# Patient Record
Sex: MALE | Race: BLACK OR AFRICAN AMERICAN | Employment: OTHER | URBAN - METROPOLITAN AREA
[De-identification: names, ages, dates, MRNs, and addresses within clinical notes are randomized per-mention and may not be internally consistent; named-entity substitution may affect disease eponyms.]

---

## 2017-07-25 ENCOUNTER — OFFICE VISIT (OUTPATIENT)
Dept: PULMONOLOGY | Age: 67
End: 2017-07-25

## 2017-07-25 VITALS
HEIGHT: 73 IN | RESPIRATION RATE: 16 BRPM | SYSTOLIC BLOOD PRESSURE: 140 MMHG | TEMPERATURE: 97.8 F | WEIGHT: 278 LBS | DIASTOLIC BLOOD PRESSURE: 80 MMHG | HEART RATE: 77 BPM | BODY MASS INDEX: 36.84 KG/M2 | OXYGEN SATURATION: 98 %

## 2017-07-25 DIAGNOSIS — E66.9 OBESITY (BMI 30-39.9): ICD-10-CM

## 2017-07-25 DIAGNOSIS — G47.33 OSA (OBSTRUCTIVE SLEEP APNEA): Primary | ICD-10-CM

## 2017-07-25 DIAGNOSIS — G47.00 INSOMNIA, UNSPECIFIED TYPE: ICD-10-CM

## 2017-07-25 DIAGNOSIS — Z72.821 INADEQUATE SLEEP HYGIENE: ICD-10-CM

## 2017-07-25 RX ORDER — IBUPROFEN 400 MG/1
1 TABLET ORAL
COMMUNITY
Start: 2017-04-24

## 2017-07-25 RX ORDER — AMLODIPINE BESYLATE 5 MG/1
10 TABLET ORAL DAILY
COMMUNITY
Start: 2017-04-24

## 2017-07-25 NOTE — PROGRESS NOTES
[facility]  Pulmonary, Critical Care, and Sleep Medicine      Sleep Office Follow up    Name: Amilcar Tolentino     : 1950     Date: 2017        Patient is a 60year old  gentleman who receives much of his medical care in the [facility]. 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 His hospital course was complicated by a pulmonary embolism and subsequent hematoma from anticoagulation that required nephrostomy tubes. He reports he has recovered from that condition and represents today for follow up of severe sleep apnea, AHI of 30, RDI of 30. He was initially started on a CPAP of 19 cwp but due to intolerance issues the patient was switched to APAP 5/20 cwp. The patient does have a beard and does have issues with mask tolerance and leaks. He normally goes to sleep at about 2400 and will sleep at most for 3 hours and then gets up to urinate. After that he does have some difficulty falling back to sleep and does not always put his CPAP mask back on. He awakens at 0530 to start his day which has been his pattern for several years. Sometimes watches TV or does work for the daytime, versus lies in the bed. Does drink tea. No history of any caffeine, alcohol or smoking. Denies any pain or leg symptoms disturbing his sleep at night      . Chan Mcarthur Winslow Sleepiness Score: 3  Initial     . Chan Mcarthur PHQ over the last two weeks 2017   Little interest or pleasure in doing things Not at all   Feeling down, depressed or hopeless Not at all   Total Score PHQ 2 0     . Chan Mcarthur PAP Compliance  Report & Summary Trends  DME: [facility]    Date >4 hr use % time AHI PAP Rx Pressure @ 95% Median Use Time Leak-Median   -17 18% (Total 64%) 4.2 APAP  14.2 3:36 56.9                                  Assessment:  1. Severe sleep apnea sub-optimized due to inadequate CPAP use  2. Sleep maintenance insomnia.    3. Inadequate sleep times/ poor sleep hygiene  4. Some of the compliance might be related to mask, pressure intolerance and poor sleep in the middle of the night. 5. Obesity    Discussion:  I have discussed the above with the patient. TAWANNA and CPAP education given as well as compliance goals. The patient may do better with a different mask due to the  On-going leaks. Sleep hygiene also discussed and handout given. The patient will work on his sleep hygiene and attempt to use more of his CPAP. S[place]y today reported at 3/24. Final Recommendations:  1. Continue with APAP for now  2. Mask refit  And CPAP supplies ordered  3. Sleep hygiene discussed. 4.  safety encouraged  5. Patient to follow up with Primary Care Provider at [facility] for report of pulmonary nodule and other health maintaince   6. Weight loss and healthy lifestyle changes encouraged  7. Follow up in three months or sooner if needed      INDICATION: ESS 13     CONCLUSION     severe obstructive sleep apnea syndrome with AHI of 30, and  respiratory disturbance index (RDI) of 30. A very successful CPAP titration with elimination of obstructive  sleep apnea and associated hypoxemia. At 19 CPAP AHI reduced to  0, RDI reduced to 0 REM supine sleep seen, REM seen. Snore index  4, Arousal index 8    Lowest oxygen saturation 74%. At optimal pressure oxygen  saturation 93-95%. Positional and REM association to respiratory events. No significant LM related arousals seen. 1 LM arousal index  improved to 0 on therapy. At 12 CPAP AHI reduced to 0, RDI reduced to 0 REM supine sleep  seen, REM seen. Snore Index 404, Arousal Index 3. Sleep efficiency during diagnostic portion 91 with 144 min of  sleep and treatment portion 52 with 187 min sleep    Average heart rate 74-84, Arrythmia none occasional PVCs seen  also elevated BP noted during surgery. RECOMMENDATIONS     Overnight CPAP at 19 CWP with ramp and humidifier.      Mask: Quattro Mirage Large or mask of choice. If issues with pressure tolerance CPAP 12 is consider optimal.    If continue to have symptoms and issues with AHI as other CPAP  13,14 were good but noted hypopnea at higher pressure consider  Auto PAP 5-20 or re titration. .  Current Outpatient Prescriptions   Medication Sig Dispense Refill    amLODIPine (NORVASC) 5 mg tablet Take 1 Tab by mouth daily.  ibuprofen (MOTRIN) 400 mg tablet Take 1 Tab by mouth daily.  cpap machine kit by Does Not Apply route. Overnight Auto CPAP at 5-20 CWP with ramp and humidifier. Mask: Nasal mask. Supplies 99 month. Please send compliance data [id number]. Diagnosis TAWANNA. AHI 30 RDI 30. [employer] order adjustment 1 Kit 0    aspirin delayed-release 81 mg tablet Take  by mouth daily.  zaleplon (SONATA) 5 mg capsule Take 1 Cap by mouth nightly. Max Daily Amount: 5 mg. 30 Cap 3    metFORMIN (GLUCOPHAGE) 850 mg tablet Take  by mouth two (2) times daily (with meals).  atorvastatin (LIPITOR) 10 mg tablet Take 10 mg by mouth daily.  naproxen (NAPROSYN) 500 mg tablet Take 500 mg by mouth two (2) times daily (with meals).  white petrolatum-mineral oil (EUCERIN) topical cream Apply  to affected area as needed for Dry Skin.  terbinafine HCl (LAMISIL) 1 % topical cream Apply  to affected area two (2) times a day.  ergocalciferol (ERGOCALCIFEROL) 50,000 unit capsule Take 50,000 Units by mouth.  tiZANidine (ZANAFLEX) 4 mg tablet Take 4 mg by mouth every six (6) hours as needed.  calcium carbonate-vitamin d2 600-125 mg-unit tab Take  by mouth.  cyclobenzaprine (FLEXERIL) 5 mg tablet Take 5 mg by mouth three (3) times daily as needed for Muscle Spasm(s).  acetaminophen (TYLENOL) 650 mg CR tablet Take 650 mg by mouth every six (6) hours as needed for Pain.  cyclobenzaprine (FLEXERIL) 10 mg tablet Take  by mouth three (3) times daily as needed for Muscle Spasm(s).            Review of Systems:  HEENT: No epistaxis, no nasal drainage, no difficulty in swallowing, no redness in eyes  Respiratory: No cough sob or wheezing  Sleep: As above  Cardiovascular: no chest pain, no palpitations, no chronic leg edema, no syncope  Gastrointestinal: no abd pain, no vomiting, no diarrhea, no bleeding symptoms  Genitourinary: No urinary symptoms or hematuria  Integument/breast: No ulcers or rashes  Musculoskeletal:Neg  Neurological: No focal weakness, no seizures, no headaches  Behvioral/Psych: No anxiety, no depression  Constitutional: No fever, no chills, no weight loss, no night sweats     Objective:     Visit Vitals    /80 (BP 1 Location: Right arm, BP Patient Position: Sitting)    Pulse 77    Temp 97.8 °F (36.6 °C)    Resp 16    Ht 6' 1\" (1.854 m)    Wt 126.1 kg (278 lb)    SpO2 98%    BMI 36.68 kg/m2        Physical Exam:   General: comfortable, no acute distress  HEENT: pupils reactive, sclera anicteric, EOM intact, Malampati score 3,   Tongue: Macroglossia no Teeth indentation no  Chin: Micrognathia no  Neck: No adenopathy or thyroid swelling, no lymphadenopathy or JVD, supple  CVS: S1S2 no murmurs  RS: Mod AE bilaterally, no tactile fremitus or egophony, no accessory muscle use  Abd: soft, non tender, no hepatosplenomegaly  Neuro: non focal, awake, alert  Extrm: no leg edema, clubbing or cyanosis  Skin: no rash    Data review:     Chemistry No results for input(s): GLU, NA, K, CL, CO2, BUN, CREA, CA, MG, PHOS, AGAP, BUCR, TBIL, GPT, AP, TP, ALB, GLOB, AGRAT in the last 72 hours. Lactic Acid No results found for: LAC  No results for input(s): LAC in the last 72 hours. Liver Enzymes No results found for: TP  No results for input(s): TP, ALB, GLOB, AGRAT, SGOT, GPT, AP, TBIL in the last 72 hours. No lab exists for component: DBIL     CBC w/Diff No results for input(s): WBC, RBC, HGB, HCT, PLT, GRANS, LYMPH, EOS, HGBEXT, HCTEXT, PLTEXT in the last 72 hours.      Cardiac Enzymes No results found for: CPK BNP No results found for: BNP     Coagulation No results for input(s): PTP, INR, APTT in the last 72 hours. No lab exists for component: INREXT      Thyroid  No results found for: T4       ABG No results for input(s): PHI, PHI, POC2, PCO2I, PO2, PO2I, HCO3, HCO3I, FIO2, FIO2I in the last 72 hours. Urinalysis No results found for: COLOR     Micro  No results for input(s): SDES, CULT in the last 72 hours. No results for input(s): CULT in the last 72 hours. XR (Most Recent). CXR reviewed by me and compared with previous CXR No results found for this or any previous visit. CT (Most Recent) No results found for this or any previous visit. EKG No results found for this or any previous visit. ECHO No results found for this or any previous visit.      PFT                     Kathren Halsted, DO  Pulmonary Critical Care & Sleep Medicine

## 2017-07-25 NOTE — PATIENT INSTRUCTIONS
Check with Primary Provider at [facility] about pulmonary nodule and if you need further evaluation.

## 2018-03-08 ENCOUNTER — OFFICE VISIT (OUTPATIENT)
Dept: PULMONOLOGY | Age: 68
End: 2018-03-08

## 2018-03-08 VITALS
WEIGHT: 280 LBS | BODY MASS INDEX: 37.11 KG/M2 | OXYGEN SATURATION: 95 % | HEIGHT: 73 IN | RESPIRATION RATE: 18 BRPM | HEART RATE: 64 BPM | SYSTOLIC BLOOD PRESSURE: 140 MMHG | DIASTOLIC BLOOD PRESSURE: 80 MMHG | TEMPERATURE: 97.3 F

## 2018-03-08 DIAGNOSIS — E66.9 OBESITY (BMI 30-39.9): ICD-10-CM

## 2018-03-08 DIAGNOSIS — G47.00 INSOMNIA, UNSPECIFIED TYPE: Primary | ICD-10-CM

## 2018-03-08 DIAGNOSIS — G47.33 OSA (OBSTRUCTIVE SLEEP APNEA): ICD-10-CM

## 2018-03-08 DIAGNOSIS — I10 ESSENTIAL HYPERTENSION: ICD-10-CM

## 2018-03-08 NOTE — PROGRESS NOTES
Chief Complaint   Patient presents with    CPAP Compliance    CPAP    Sleep Apnea     Patient here for CPAP compliance report evaluation. Depression Screening done PHQ 2 populated. Further assessment not needed. PHQ2=0,ESS=8 DR. Ulrich notified.

## 2018-03-08 NOTE — MR AVS SNAPSHOT
[address] [facility], [address] [address] 
[phone] Patient: Edith Arshad MRN: [id number] RSU:[birth year] Visit Information Date & Time Provider Department Dept. Phone Encounter #  
 3/8/2018  8:30 AM Tiffany Wright DO [facility] Frank Pavon [id number] Follow-up Instructions Return in about 3 months (around 6/8/2018). Upcoming Health Maintenance Date Due Hepatitis C Screening 1950 DTaP/Tdap/Td series (1 - Tdap) 11/9/1973 FOBT Q 1 YEAR AGE 50-75 11/9/2002 ZOSTER VACCINE AGE 60> 9/9/2012 Influenza Age 5 to Adult 8/1/2017 GLAUCOMA SCREENING Q2Y 11/9/2017 Pneumococcal 65+ Low/Medium Risk (1 of 2 - PCV13) 11/9/2017 MEDICARE YEARLY EXAM 11/9/2017 Allergies as of 3/8/2018  Review Complete On: 3/8/2018 By: Michael Gould LPN No Known Allergies Current Immunizations  Never Reviewed No immunizations on file. Not reviewed this visit You Were Diagnosed With   
  
 Codes Comments Insomnia, unspecified type    -  Primary ICD-10-CM: G47.00 ICD-9-CM: 780.52   
 TAWANNA (obstructive sleep apnea)     ICD-10-CM: G47.33 
ICD-9-CM: 327.23 Vitals BP Pulse Temp Resp Height(growth percentile) Weight(growth percentile) 140/80 (BP 1 Location: Left arm, BP Patient Position: Sitting) 64 97.3 °F (36.3 °C) (Oral) 18 6' 1\" (1.854 m) 280 lb (127 kg) SpO2 BMI Smoking Status 95% 36.94 kg/m2 Never Smoker BMI and BSA Data Body Mass Index Body Surface Area  
 36.94 kg/m 2 2.56 m 2 Your Updated Medication List  
  
   
This list is accurate as of 3/8/18  8:49 AM.  Always use your most recent med list.  
  
  
  
  
 acetaminophen 650 mg Tber Commonly known as:  TYLENOL Take 650 mg by mouth every six (6) hours as needed for Pain. amLODIPine 5 mg tablet Commonly known as:  [place] Sandhoff Take 1 Tab by mouth daily. aspirin delayed-release 81 mg tablet Take  by mouth daily. atorvastatin 10 mg tablet Commonly known as:  LIPITOR Take 10 mg by mouth daily. calcium carbonate-vitamin d2 600-125 mg-unit Tab Take  by mouth. cpap machine kit  
by Does Not Apply route. Overnight Auto CPAP at 5-20 CWP with ramp and humidifier. Mask: Nasal mask. Supplies 99 month. Please send compliance data [id number]. Diagnosis TAWANNA. AHI 30 RDI 30. [employer] order adjustment * cyclobenzaprine 5 mg tablet Commonly known as:  FLEXERIL Take 5 mg by mouth three (3) times daily as needed for Muscle Spasm(s). * cyclobenzaprine 10 mg tablet Commonly known as:  FLEXERIL Take  by mouth three (3) times daily as needed for Muscle Spasm(s). ergocalciferol 50,000 unit capsule Commonly known as:  ERGOCALCIFEROL Take 50,000 Units by mouth. ibuprofen 400 mg tablet Commonly known as:  MOTRIN Take 1 Tab by mouth daily. metFORMIN 850 mg tablet Commonly known as:  GLUCOPHAGE Take  by mouth two (2) times daily (with meals). naproxen 500 mg tablet Commonly known as:  NAPROSYN Take 500 mg by mouth two (2) times daily (with meals). OTHER(NON-FORMULARY) 2mg tab Ultramelatonin (REMfresh). Take 1 tab QHS can increase up to 5 tabs QHS  
  
 terbinafine HCl 1 % topical cream  
Commonly known as:  LAMISIL Apply  to affected area two (2) times a day. tiZANidine 4 mg tablet Commonly known as:  Tresia Desiree Take 4 mg by mouth every six (6) hours as needed. white petrolatum-mineral oil topical cream  
Commonly known as:  EUCERIN Apply  to affected area as needed for Dry Skin.  
  
 zaleplon 5 mg capsule Commonly known as:  SONATA Take 1 Cap by mouth nightly. Max Daily Amount: 5 mg.  
  
 * Notice: This list has 2 medication(s) that are the same as other medications prescribed for you.  Read the directions carefully, and ask your doctor or other care provider to review them with you. We Performed the Following AMB SUPPLY ORDER [[id number] Custom] Comments:  
 Please renew all CPAP supplies. Refit mask due to excess leaks. Dx: TAWANNA Follow-up Instructions Return in about 3 months (around 6/8/2018). Introducing [facility]! Viji Copeland introduces [email or web address] patient portal. Now you can access parts of your medical record, email your doctor's office, and request medication refills online. 1. In your internet browser, go to [email or web address]. [email or web address] 2. Click on the First Time User? Click Here link in the Sign In box. You will see the New Member Sign Up page. 3. Enter your [email or web address] Access Code exactly as it appears below. You will not need to use this code after youve completed the sign-up process. If you do not sign up before the expiration date, you must request a new code. · [email or web address] Access Code: [email or web address] Expires: 6/6/2018  8:07 AM 
 
4. Enter the last four digits of your Social Security Number (xxxx) and Date of Birth (mm/dd/yyyy) as indicated and click Submit. You will be taken to the next sign-up page. 5. Create a [email or web address] ID. This will be your [email or web address] login ID and cannot be changed, so think of one that is secure and easy to remember. 6. Create a [email or web address] password. You can change your password at any time. 7. Enter your Password Reset Question and Answer. This can be used at a later time if you forget your password. 8. Enter your e-mail address. You will receive e-mail notification when new information is available in [address] 9. Click Sign Up. You can now view and download portions of your medical record. 10. Click the Download Summary menu link to download a portable copy of your medical information. If you have questions, please visit the Frequently Asked Questions section of the [email or web address] website.  Remember, [email or web address] is NOT to be used for urgent needs. For medical emergencies, dial 911. Now available from your iPhone and Android! Please provide this summary of care documentation to your next provider. Your primary care clinician is listed as Claudette Koroma. If you have any questions after today's visit, please call [phone].

## 2018-03-08 NOTE — PROGRESS NOTES
TA[facility]NYA [facility]  Pulmonary, Critical Care, and Sleep Medicine      Sleep Office Follow up    Name: Jason Gutierrez     : 1950     Date: 3/8/2018         Reason For Visit: Follow Up Severe TAWANNA    TAWANNA:  The patient presents today for routine follow up. States he has been trying to use his APAP more. He reports breathing better when he sleeps when he uses his device. He does use his device nearly every night. Insomnia continues to be an issue. He was also noted to have a leak issue with his mask. Per the patient his DME did not refit his mask. Insomnia:  Goes to bed when he feels tired which is usually between 1586-2362. Room is dark. Does not look at TV or use electronic devices. Sleeps for about 3 hours and then awakens for unknown reason. Denies any pain issues. No nightmares. After awakening he has difficulty going back to sleep. Does not take planned naps but he does doze off during the day. Drowsy driving occurs on long distance driving which he avoids. Prior Sleep Agents  - Sonata- no benefit    Weight loss:  - He has purchased a Fit Bit and has been trying to monitor his sleep and activity with this device. He is currently working on achieving 6K steps.      [place]:  Initial     [place] Scale 3/8/2018 2017 2015   Sitting and Reading 1 0 2   Watching TV 1 1 3   Sitting, inactive in a public place (e.g. a movie theater or meeting) 1 0 1   As a passenger in a car for an hour, without a break 2 1 2   Lying down to rest in the afternoon, when circumstances permit 2 1 3   Sitting and talking to someone 0 0 0   Sitting quietly after lunch without alcohol 1 0 2   In a car, while stopped for a few minutes in traffic 0 0 0   [place] Sleepiness Score 8 3 13     PHQ over the last two weeks 3/8/2018   PHQ Not Done Patient Decline   Little interest or pleasure in doing things Not at all   Feeling down, depressed or hopeless Not at all   Total Score PHQ 2 0     PAP Compliance  Report & Summary Trends  DME: [facility]    Date >4 hr use % time (Total % Use) AHI PAP Rx Pressure @ 95% Median Use Time Leak-Median   4/26-7/24/17 18 (64) 4.2 APAP 5/20 14.2 3:36 56.9   12/8/17-3/7/2018 49 (86) 4.4  13.2 4:14 73.3                       Current Outpatient Prescriptions   Medication Sig Dispense Refill    amLODIPine (NORVASC) 5 mg tablet Take 1 Tab by mouth daily.  ibuprofen (MOTRIN) 400 mg tablet Take 1 Tab by mouth daily.  cpap machine kit by Does Not Apply route. Overnight Auto CPAP at 5-20 CWP with ramp and humidifier. Mask: Nasal mask. Supplies 99 month. Please send compliance data [id number]. Diagnosis TAWANNA. AHI 30 RDI 30. [facility] order adjustment 1 Kit 0    aspirin delayed-release 81 mg tablet Take  by mouth daily.  acetaminophen (TYLENOL) 650 mg CR tablet Take 650 mg by mouth every six (6) hours as needed for Pain.  zaleplon (SONATA) 5 mg capsule Take 1 Cap by mouth nightly. Max Daily Amount: 5 mg. 30 Cap 3    metFORMIN (GLUCOPHAGE) 850 mg tablet Take  by mouth two (2) times daily (with meals).  atorvastatin (LIPITOR) 10 mg tablet Take 10 mg by mouth daily.  naproxen (NAPROSYN) 500 mg tablet Take 500 mg by mouth two (2) times daily (with meals).  white petrolatum-mineral oil (EUCERIN) topical cream Apply  to affected area as needed for Dry Skin.  terbinafine HCl (LAMISIL) 1 % topical cream Apply  to affected area two (2) times a day.  ergocalciferol (ERGOCALCIFEROL) 50,000 unit capsule Take 50,000 Units by mouth.  tiZANidine (ZANAFLEX) 4 mg tablet Take 4 mg by mouth every six (6) hours as needed.  calcium carbonate-vitamin d2 600-125 mg-unit tab Take  by mouth.  cyclobenzaprine (FLEXERIL) 5 mg tablet Take 5 mg by mouth three (3) times daily as needed for Muscle Spasm(s).  cyclobenzaprine (FLEXERIL) 10 mg tablet Take  by mouth three (3) times daily as needed for Muscle Spasm(s).            Review of Systems:  HEENT: No epistaxis, no nasal drainage, no difficulty in swallowing, no redness in eyes  Respiratory: No cough sob or wheezing  Sleep: As above  Cardiovascular: no chest pain, no palpitations, no chronic leg edema, no syncope  Gastrointestinal: no abd pain, no vomiting, no diarrhea, no bleeding symptoms  Genitourinary: No urinary symptoms or hematuria  Integument/breast: No ulcers or rashes  Musculoskeletal:Neg  Neurological: No focal weakness, no seizures, no headaches  Behvioral/Psych: No anxiety, no depression  Constitutional: No fever, no chills, no weight loss, no night sweats     Objective:     Visit Vitals    /80 (BP 1 Location: Left arm, BP Patient Position: Sitting)    Pulse 64    Temp 97.3 °F (36.3 °C) (Oral)    Resp 18    Ht 6' 1\" (1.854 m)    Wt 127 kg (280 lb)    SpO2 95%  Comment: [email or web address]    BMI 36.94 kg/m2          4/1/2015 10/13/2015 7/25/2017 3/8/2018   WEIGHT 278 lb 275 lb 4 oz 278 lb 280 lb         Physical Exam:   General: comfortable, no acute distress, Obese body habitus  HEENT: pupils reactive, sclera anicteric, EOM intact, Malampati score 3,   Tongue: + Macroglossia, midline  Chin: Micrognathia no  Neck: No adenopathy or thyroid swelling, no lymphadenopathy or JVD, supple  CVS: S1S2 no murmurs  RS: Mod AE bilaterally, no tactile fremitus or egophony, no accessory muscle use  Abd: Obeese,soft, non tender, no hepatosplenomegaly palpated  Ext:  No clubbing, edema or cyanosis  Neuro: non focal, awake, alert  Extrm: no leg edema, clubbing or cyanosis  Skin: no rash    Data review:     Chemistry No results for input(s): GLU, NA, K, CL, CO2, BUN, CREA, CA, MG, PHOS, AGAP, BUCR, TBIL, GPT, AP, TP, ALB, GLOB, AGRAT in the last 72 hours. Lactic Acid No results found for: LAC  No results for input(s): LAC in the last 72 hours. Liver Enzymes No results found for: TP  No results for input(s): TP, ALB, GLOB, AGRAT, SGOT, GPT, AP, TBIL in the last 72 hours.     No lab exists for component: DBIL CBC w/Diff No results for input(s): WBC, RBC, HGB, HCT, PLT, GRANS, LYMPH, EOS, HGBEXT, HCTEXT, PLTEXT in the last 72 hours. Cardiac Enzymes No results found for: CPK     BNP No results found for: BNP     Coagulation No results for input(s): PTP, INR, APTT in the last 72 hours. No lab exists for component: INREXT      Thyroid  No results found for: T4       ABG No results for input(s): PHI, PHI, POC2, PCO2I, PO2, PO2I, HCO3, HCO3I, FIO2, FIO2I in the last 72 hours. Urinalysis No results found for: COLOR     Micro  No results for input(s): SDES, CULT in the last 72 hours. No results for input(s): CULT in the last 72 hours. XR (Most Recent). CXR reviewed by me and compared with previous CXR No results found for this or any previous visit. CT (Most Recent) No results found for this or any previous visit. EKG No results found for this or any previous visit. ECHO No results found for this or any previous visit. PFT        Prior Sleep Testing  1/21/2015  INDICATION: ESS 13     CONCLUSION     severe obstructive sleep apnea syndrome with AHI of 30, and  respiratory disturbance index (RDI) of 30. A very successful CPAP titration with elimination of obstructive  sleep apnea and associated hypoxemia. At 19 CPAP AHI reduced to  0, RDI reduced to 0 REM supine sleep seen, REM seen. Snore index  4, Arousal index 8    Lowest oxygen saturation 74%. At optimal pressure oxygen  saturation 93-95%. Positional and REM association to respiratory events. No significant LM related arousals seen. 1 LM arousal index  improved to 0 on therapy. At 12 CPAP AHI reduced to 0, RDI reduced to 0 REM supine sleep  seen, REM seen. Snore Index 404, Arousal Index 3. Sleep efficiency during diagnostic portion 91 with 144 min of  sleep and treatment portion 52 with 187 min sleep    Average heart rate 74-84, Arrythmia none occasional PVCs seen  also elevated BP noted during surgery.       RECOMMENDATIONS Overnight CPAP at 19 CWP with ramp and humidifier. Mask: Quattro [place] Large or mask of choice. If issues with pressure tolerance CPAP 12 is consider optimal.    If continue to have symptoms and issues with AHI as other CPAP  13,14 were good but noted hypopnea at higher pressure consider  Auto PAP 5-20 or re titration. Assessment:  1. Severe sleep apnea (AHI 30; 2015)- improved use time; insomnia inhibiting use. [place] improved from 2015  2. Sleep maintenance insomnia. 3. Inadequate sleep times/ poor sleep hygiene  4. Some of the compliance might be related to mask, pressure intolerance and poor sleep in the middle of the night. 5. Obesity    Discussion:  I have discussed the above with the patient. TAWANNA and CPAP education given as well as compliance goals. The patient may do better with a different mask due to the  On-going leaks. Sleep hygiene also discussed and handout given. The patient will work on his sleep hygiene and attempt to use more of his CPAP. I have also given the patient a sample of ultramel melatonin (RemFresh) to see if this may improved his sleep time. Final Recommendations:  1. Continue with APAP for now  2. Start a trial of RemFresh 2-6mg QHS  3. Mask refit  And CPAP supplies ordered  4. Sleep hygiene discussed. 5.  safety discussed  6. Follow up with Primary Care Provider (PCP) as directed and for routine health care maintenance. 7. Weight loss and healthy lifestyle changes encouraged  8.  Follow up in three months or sooner if needed         Sharmila Garvey, DO  Pulmonary Critical Care & Sleep Medicine

## 2018-06-07 ENCOUNTER — OFFICE VISIT (OUTPATIENT)
Dept: PULMONOLOGY | Age: 68
End: 2018-06-07

## 2018-06-07 VITALS
RESPIRATION RATE: 18 BRPM | WEIGHT: 279 LBS | DIASTOLIC BLOOD PRESSURE: 68 MMHG | SYSTOLIC BLOOD PRESSURE: 128 MMHG | BODY MASS INDEX: 36.98 KG/M2 | OXYGEN SATURATION: 97 % | HEART RATE: 84 BPM | HEIGHT: 73 IN | TEMPERATURE: 98 F

## 2018-06-07 DIAGNOSIS — G47.33 OSA (OBSTRUCTIVE SLEEP APNEA): Primary | ICD-10-CM

## 2018-06-07 DIAGNOSIS — I10 HYPERTENSION, UNSPECIFIED TYPE: ICD-10-CM

## 2018-06-07 DIAGNOSIS — E66.9 OBESITY (BMI 30-39.9): ICD-10-CM

## 2018-06-07 PROBLEM — E66.01 SEVERE OBESITY (BMI 35.0-39.9): Status: ACTIVE | Noted: 2018-06-07

## 2018-06-07 NOTE — MR AVS SNAPSHOT
[address] [facility] [address] [address], [address] [address] 
[phone] Patient: Benjamin Michael MRN: [id number] OBB:36/3/2097 Visit Information Date & Time Provider Department Dept. Phone Encounter #  
 6/7/2018  2:30 PM Carlito Powers [facility] [id number] Follow-up Instructions Return in about 1 year (around 6/7/2019). Upcoming Health Maintenance Date Due Hepatitis C Screening 1950 DTaP/Tdap/Td series (1 - Tdap) 11/9/1973 FOBT Q 1 YEAR AGE 50-75 11/9/2002 ZOSTER VACCINE AGE 60> 9/9/2012 GLAUCOMA SCREENING Q2Y 11/9/2017 Pneumococcal 65+ Low/Medium Risk (1 of 2 - PCV13) 11/9/2017 MEDICARE YEARLY EXAM 3/14/2018 Influenza Age 5 to Adult 8/1/2018 Allergies as of 6/7/2018  Review Complete On: 6/7/2018 By: Lamin Souza LPN No Known Allergies Current Immunizations  Never Reviewed No immunizations on file. Not reviewed this visit Vitals BP Pulse Temp Resp Height(growth percentile) Weight(growth percentile) 128/68 (BP 1 Location: Left arm, BP Patient Position: Sitting) 84 98 °F (36.7 °C) (Oral) 18 6' 1\" (1.854 m) 279 lb (126.6 kg) SpO2 BMI Smoking Status 97% 36.81 kg/m2 Never Smoker Vitals History BMI and BSA Data Body Mass Index Body Surface Area  
 36.81 kg/m 2 2.55 m 2 Your Updated Medication List  
  
   
This list is accurate as of 6/7/18  3:20 PM.  Always use your most recent med list.  
  
  
  
  
 acetaminophen 650 mg Tber Commonly known as:  TYLENOL Take 650 mg by mouth every six (6) hours as needed for Pain. amLODIPine 5 mg tablet Commonly known as:  [place] Take 1 Tab by mouth daily. aspirin delayed-release 81 mg tablet Take  by mouth daily. atorvastatin 10 mg tablet Commonly known as:  LIPITOR Take 10 mg by mouth daily. calcium carbonate-vitamin d2 600-125 mg-unit Tab Take  by mouth. cpap machine kit  
by Does Not Apply route. Overnight Auto CPAP at 5-20 CWP with ramp and humidifier. Mask: Nasal mask. Supplies 99 month. Please send compliance data [id number]. Diagnosis TAWANNA. AHI 30 RDI 30. [facility] order adjustment * cyclobenzaprine 5 mg tablet Commonly known as:  FLEXERIL Take 5 mg by mouth three (3) times daily as needed for Muscle Spasm(s). * cyclobenzaprine 10 mg tablet Commonly known as:  FLEXERIL Take  by mouth three (3) times daily as needed for Muscle Spasm(s). ergocalciferol 50,000 unit capsule Commonly known as:  ERGOCALCIFEROL Take 50,000 Units by mouth. ibuprofen 400 mg tablet Commonly known as:  MOTRIN Take 1 Tab by mouth daily. metFORMIN 850 mg tablet Commonly known as:  GLUCOPHAGE Take  by mouth two (2) times daily (with meals). naproxen 500 mg tablet Commonly known as:  NAPROSYN Take 500 mg by mouth two (2) times daily (with meals). OTHER(NON-FORMULARY) 2mg tab Ultramelatonin (REMfresh). Take 1 tab QHS can increase up to 5 tabs QHS  
  
 terbinafine HCl 1 % topical cream  
Commonly known as:  LAMISIL Apply  to affected area two (2) times a day. tiZANidine 4 mg tablet Commonly known as:  [place] Take 4 mg by mouth every six (6) hours as needed. white petrolatum-mineral oil topical cream  
Commonly known as:  EUCERIN Apply  to affected area as needed for Dry Skin.  
  
 zaleplon 5 mg capsule Commonly known as:  SONATA Take 1 Cap by mouth nightly. Max Daily Amount: 5 mg.  
  
 * Notice: This list has 2 medication(s) that are the same as other medications prescribed for you. Read the directions carefully, and ask your doctor or other care provider to review them with you. Follow-up Instructions Return in about 1 year (around 6/7/2019). Introducing \[facility] [place] [facility]\"" & HEALTH SERVICES! [employer] introduces [employer] patient portal. Now you can access parts of your medical record, email your doctor's office, and request medication refills online. 1. In your internet browser, go to [email or web address] 2. Click on the First Time User? Click Here link in the Sign In box. You will see the New Member Sign Up page. 3. Enter your [employer]rev Access Code exactly as it appears below. You will not need to use this code after youve completed the sign-up process. If you do not sign up before the expiration date, you must request a new code. · [employer]rev Access Code: [email or web address] Expires: 9/5/2018  3:20 PM 
 
4. Enter the last four digits of your Social Security Number (xxxx) and Date of Birth (mm/dd/yyyy) as indicated and click Submit. You will be taken to the next sign-up page. 5. Create a [employer]rev ID. This will be your [employer]rev login ID and cannot be changed, so think of one that is secure and easy to remember. 6. Create a [employer]Brev password. You can change your password at any time. 7. Enter your Password Reset Question and Answer. This can be used at a later time if you forget your password. 8. Enter your e-mail address. You will receive e-mail notification when new information is available in [address] 9. Click Sign Up. You can now view and download portions of your medical record. 10. Click the Download Summary menu link to download a portable copy of your medical information. If you have questions, please visit the Frequently Asked Questions section of the [employer] website. Remember, [employer] is NOT to be used for urgent needs. For medical emergencies, dial 911. Now available from your iPhone and Android! Please provide this summary of care documentation to your next provider. Your primary care clinician is listed as Shayan Powers. If you have any questions after today's visit, please call [phone].

## 2018-06-07 NOTE — PROGRESS NOTES
[facility]  Pulmonary, Critical Care, and Sleep Medicine      Sleep Office Follow up    Name: Edith Louise     : 1950     Date: 2018         Reason For Visit: Follow Up Severe TAWANNA    TAWANNA:  The patient presents today for routine follow up. States he has been using his APAP more. He changed our mask and headgear and leaks are not an issue. He feels more comfortable in adjusting the headgear in order to obtain a proper fit. He reports breathing better when he sleeps when he uses his device. He also notes more energy during the day. Insomnia:  His sleeping has improved but if he awakens, if is difficult to fall back to sleep. He was given a sample of Rem-Fresh (ultramel-melatonin) which he felt aided in sleep. He does not always dedicate 7 hours for sleep.  On nights with less than 7 hours of sleep he woke up feeling groggy so he stopped taking the RemFresh    Prior Sleep Agents  - Sonata- no benefit  - RemFresh- works but groggy if does not dedicate at least 7 hours for sleep      Gunlock:  Initial     Gunlock Scale 2018 3/8/2018 2017 2015   Sitting and Reading 1 1 0 2   Watching TV 2 1 1 3   Sitting, inactive in a public place (e.g. a movie theater or meeting) 1 1 0 1   As a passenger in a car for an hour, without a break 2 2 1 2   Lying down to rest in the afternoon, when circumstances permit 2 2 1 3   Sitting and talking to someone 0 0 0 0   Sitting quietly after lunch without alcohol 2 1 0 2   In a car, while stopped for a few minutes in traffic 0 0 0 0   Gunlock Sleepiness Score 10 8 3 13     PHQ over the last two weeks 2018   PHQ Not Done -   Little interest or pleasure in doing things Not at all   Feeling down, depressed or hopeless Not at all   Total Score PHQ 2 0     PAP Compliance  Report & Summary Trends  DME: [facility]      Date >4 hr use % time (Total % Use) AHI PAP Rx Pressure @ 95% Median Use Time Leak-Median   -17 18 (64) 4.2 APAP  14.2 3:36 56.9   12/8/17-3/7/2018 49 (86) 4.4 APAP 5/20 13.2 4:14 73.3   3/9/18-6/6/18 77(89) 3.7 APAP 5/20 15.6 5:01 9.1              Current Outpatient Prescriptions   Medication Sig Dispense Refill    OTHER,NON-FORMULARY, 2mg tab Ultramelatonin (REMfresh). Take 1 tab QHS can increase up to 5 tabs QHS 12 Each 0    amLODIPine (NORVASC) 5 mg tablet Take 1 Tab by mouth daily.  ibuprofen (MOTRIN) 400 mg tablet Take 1 Tab by mouth daily.  cpap machine kit by Does Not Apply route. Overnight Auto CPAP at 5-20 CWP with ramp and humidifier. Mask: Nasal mask. Supplies 99 month. Please send compliance data [id number]. Diagnosis TAWANNA. AHI 30 RDI 30. [employer] order adjustment 1 Kit 0    zaleplon (SONATA) 5 mg capsule Take 1 Cap by mouth nightly. Max Daily Amount: 5 mg. 30 Cap 3    terbinafine HCl (LAMISIL) 1 % topical cream Apply  to affected area two (2) times a day.  aspirin delayed-release 81 mg tablet Take  by mouth daily.  acetaminophen (TYLENOL) 650 mg CR tablet Take 650 mg by mouth every six (6) hours as needed for Pain.  metFORMIN (GLUCOPHAGE) 850 mg tablet Take  by mouth two (2) times daily (with meals).  atorvastatin (LIPITOR) 10 mg tablet Take 10 mg by mouth daily.  naproxen (NAPROSYN) 500 mg tablet Take 500 mg by mouth two (2) times daily (with meals).  white petrolatum-mineral oil (EUCERIN) topical cream Apply  to affected area as needed for Dry Skin.  ergocalciferol (ERGOCALCIFEROL) 50,000 unit capsule Take 50,000 Units by mouth.  tiZANidine (ZANAFLEX) 4 mg tablet Take 4 mg by mouth every six (6) hours as needed.  calcium carbonate-vitamin d2 600-125 mg-unit tab Take  by mouth.  cyclobenzaprine (FLEXERIL) 5 mg tablet Take 5 mg by mouth three (3) times daily as needed for Muscle Spasm(s).  cyclobenzaprine (FLEXERIL) 10 mg tablet Take  by mouth three (3) times daily as needed for Muscle Spasm(s).            Review of Systems:  HEENT: No epistaxis, no nasal drainage, no difficulty in swallowing, no redness in eyes  Respiratory: No cough sob or wheezing  Sleep: As above  Cardiovascular: no chest pain, no palpitations, no chronic leg edema, no syncope  Gastrointestinal: no abd pain, no vomiting, no diarrhea, no bleeding symptoms  Genitourinary: No urinary symptoms or hematuria  Integument/breast: No ulcers or rashes  Musculoskeletal:Neg  Neurological: No focal weakness, no seizures, no headaches  Behvioral/Psych: No anxiety, no depression  Constitutional: No fever, no chills, no weight loss, no night sweats     Objective:     Visit Vitals    /68 (BP 1 Location: Left arm, BP Patient Position: Sitting)    Pulse 84    Temp 98 °F (36.7 °C) (Oral)    Resp 18    Ht 6' 1\" (1.854 m)    Wt 126.6 kg (279 lb)    SpO2 97%    BMI 36.81 kg/m2             7/25/2017 3/8/2018 6/7/2018   WEIGHT 278 lb 280 lb 279 lb       Physical Exam:   General: comfortable, no acute distress, Obese body habitus  HEENT: pupils reactive, sclera anicteric, EOM intact, Malampati score 3,   Tongue: + Macroglossia, midline  Chin: Micrognathia no  Neck: No adenopathy or thyroid swelling, no lymphadenopathy or JVD, supple  CVS: S1S2 no murmurs  RS: Moderate air movement bilaterally, no accessory muscle use, No wheezing  Abd: Obeese,soft, non tender, no hepatosplenomegaly palpated  Ext:  No clubbing, edema or cyanosis  Neuro: non focal, awake, alert  Extrm: no leg edema, clubbing or cyanosis  Skin: no rash    Data review:     Chemistry No results for input(s): GLU, NA, K, CL, CO2, BUN, CREA, CA, MG, PHOS, AGAP, BUCR, TBIL, GPT, AP, TP, ALB, GLOB, AGRAT in the last 72 hours. Lactic Acid No results found for: LAC  No results for input(s): LAC in the last 72 hours. Liver Enzymes No results found for: TP  No results for input(s): TP, ALB, GLOB, AGRAT, SGOT, GPT, AP, TBIL in the last 72 hours.     No lab exists for component: DBIL     CBC w/Diff No results for input(s): WBC, RBC, HGB, HCT, PLT, GRANS, LYMPH, EOS, HGBEXT, HCTEXT, PLTEXT in the last 72 hours. Cardiac Enzymes No results found for: CPK     BNP No results found for: BNP     Coagulation No results for input(s): PTP, INR, APTT in the last 72 hours. No lab exists for component: INREXT      Thyroid  No results found for: T4       ABG No results for input(s): PHI, PHI, POC2, PCO2I, PO2, PO2I, HCO3, HCO3I, FIO2, FIO2I in the last 72 hours. Urinalysis No results found for: COLOR     Micro  No results for input(s): SDES, CULT in the last 72 hours. No results for input(s): CULT in the last 72 hours. XR (Most Recent). CXR reviewed by me and compared with previous CXR No results found for this or any previous visit. CT (Most Recent) No results found for this or any previous visit. EKG No results found for this or any previous visit. ECHO No results found for this or any previous visit. PFT        Prior Sleep Testing  1/21/2015  INDICATION: ESS 13     CONCLUSION     severe obstructive sleep apnea syndrome with AHI of 30, and  respiratory disturbance index (RDI) of 30. A very successful CPAP titration with elimination of obstructive  sleep apnea and associated hypoxemia. At 19 CPAP AHI reduced to  0, RDI reduced to 0 REM supine sleep seen, REM seen. Snore index  4, Arousal index 8    Lowest oxygen saturation 74%. At optimal pressure oxygen  saturation 93-95%. Positional and REM association to respiratory events. No significant LM related arousals seen. 1 LM arousal index  improved to 0 on therapy. At 12 CPAP AHI reduced to 0, RDI reduced to 0 REM supine sleep  seen, REM seen. Snore Index 404, Arousal Index 3. Sleep efficiency during diagnostic portion 91 with 144 min of  sleep and treatment portion 52 with 187 min sleep    Average heart rate 74-84, Arrythmia none occasional PVCs seen  also elevated BP noted during surgery. RECOMMENDATIONS     Overnight CPAP at 19 CWP with ramp and humidifier. Mask: Quattro Mirage Large or mask of choice. If issues with pressure tolerance CPAP 12 is consider optimal.    If continue to have symptoms and issues with AHI as other CPAP  13,14 were good but noted hypopnea at higher pressure consider  Auto PAP 5-20 or re titration. Assessment:  1. Severe sleep apnea (AHI 30; 2015)- improved use time; insomnia inhibiting use. [place] improved from 2015  2. Sleep maintenance insomnia. 3. Inadequate sleep times/ poor sleep hygiene  4. Some of the compliance might be related to mask, pressure intolerance and poor sleep in the middle of the night. 5. Obesity  6. History of pulmonary nodule- reportedly follow at [facility]. [place]- no imaging in our system    Discussion:  I have discussed the above with the patient. TAWANNA and CPAP education given as well as compliance goals. The patient may do better with a different mask due to the  On-going leaks. Sleep hygiene also discussed and handout given. The patient will work on his sleep hygiene and attempt to use more of his CPAP. I have also given the patient a sample of ultramel melatonin (RemFresh) to see if this may improved his sleep time. Final Recommendations:  1. Continue with APAP for now  2. Continue RemFresh 2-6mg QHS- PRN- dedicate at least 7 hours for sleeping  3. Renew CPAP supplies  4. Sleep hygiene discussed. 5.  safety discussed  6. Follow up with Primary Care Provider (PCP) as directed and for routine health care maintenance. 7. Weight loss and healthy lifestyle changes encouraged  8.  Follow up in 12 months or sooner if needed           Imelda Hernandez DO, [facility]  Pulmonary, Sleep, Critical Care Medicine

## 2018-06-07 NOTE — PROGRESS NOTES
Chief Complaint   Patient presents with    Sleep Apnea     1. Have you been to the ER, urgent care clinic since your last visit? Hospitalized since your last visit? No    2. Have you seen or consulted any other health care providers outside of the [address] [place] [address] since your last visit? Include any pap smears or colon screening.  No

## 2019-02-20 ENCOUNTER — HOSPITAL ENCOUNTER (OUTPATIENT)
Dept: MRI IMAGING | Age: 69
Discharge: HOME | End: 2019-02-20
Attending: NEUROLOGICAL SURGERY
Payer: MEDICARE

## 2019-02-20 DIAGNOSIS — D36.11 NEUROFIBROMA OF NECK: ICD-10-CM

## 2019-02-20 LAB — CREAT UR-MCNC: 1.1 MG/DL (ref 0.6–1.3)

## 2019-02-20 PROCEDURE — 82565 ASSAY OF CREATININE: CPT

## 2019-02-20 PROCEDURE — 74011636320 HC RX REV CODE- 636/320

## 2019-02-20 PROCEDURE — 72156 MRI NECK SPINE W/O & W/DYE: CPT

## 2019-02-20 PROCEDURE — A9575 INJ GADOTERATE MEGLUMI 0.1ML: HCPCS

## 2019-02-20 RX ADMIN — GADOTERATE MEGLUMINE 15 ML: 376.9 INJECTION INTRAVENOUS at 11:17

## 2019-08-07 ENCOUNTER — OFFICE VISIT (OUTPATIENT)
Dept: PULMONOLOGY | Age: 69
End: 2019-08-07

## 2019-08-07 VITALS
HEART RATE: 74 BPM | RESPIRATION RATE: 18 BRPM | OXYGEN SATURATION: 98 % | BODY MASS INDEX: 37.23 KG/M2 | TEMPERATURE: 97 F | DIASTOLIC BLOOD PRESSURE: 70 MMHG | WEIGHT: 280.9 LBS | SYSTOLIC BLOOD PRESSURE: 134 MMHG | HEIGHT: 73 IN

## 2019-08-07 DIAGNOSIS — I10 HYPERTENSION, UNSPECIFIED TYPE: ICD-10-CM

## 2019-08-07 DIAGNOSIS — R91.1 PULMONARY NODULE: Primary | ICD-10-CM

## 2019-08-07 DIAGNOSIS — E66.9 OBESITY (BMI 30-39.9): ICD-10-CM

## 2019-08-07 DIAGNOSIS — G47.33 OSA (OBSTRUCTIVE SLEEP APNEA): ICD-10-CM

## 2019-08-07 RX ORDER — ASPIRIN 81 MG/1
TABLET ORAL
COMMUNITY
Start: 2018-01-12 | End: 2019-08-07 | Stop reason: SDUPTHER

## 2019-08-07 RX ORDER — NAPROXEN 500 MG/1
TABLET ORAL
COMMUNITY
Start: 2018-01-12 | End: 2019-08-07 | Stop reason: SDUPTHER

## 2019-08-07 RX ORDER — CYCLOBENZAPRINE HCL 10 MG
10 TABLET ORAL
COMMUNITY
Start: 2019-08-07 | End: 2019-08-07

## 2019-08-07 RX ORDER — AMLODIPINE BESYLATE 5 MG/1
TABLET ORAL
COMMUNITY
Start: 2018-01-12 | End: 2019-08-07 | Stop reason: SDUPTHER

## 2019-08-07 RX ORDER — ACETAMINOPHEN 500 MG
500 TABLET ORAL
COMMUNITY
Start: 2019-08-07 | End: 2019-08-07

## 2019-08-07 RX ORDER — LOSARTAN POTASSIUM AND HYDROCHLOROTHIAZIDE 12.5; 5 MG/1; MG/1
1 TABLET ORAL
COMMUNITY
Start: 2019-08-07 | End: 2019-08-07

## 2019-08-07 NOTE — PROGRESS NOTES
Ana Aguayo presents today for   Chief Complaint   Patient presents with    Sleep Apnea     follow up from 6/7/2018       Is someone accompanying this pt? No    Is the patient using any DME equipment during OV? Yes. CPAP machine    -[employer]    Depression Screening:  3 most recent PHQ Screens 8/7/2019   PHQ Not Done -   Little interest or pleasure in doing things Not at all   Feeling down, depressed, irritable, or hopeless Not at all   Total Score PHQ 2 0       Learning Assessment:  Learning Assessment 7/25/2017   PRIMARY LEARNER Patient   HIGHEST LEVEL OF EDUCATION - PRIMARY LEARNER  > 4 YEARS OF COLLEGE   PRIMARY LANGUAGE ENGLISH   LEARNER PREFERENCE PRIMARY READING     DEMONSTRATION   ANSWERED BY patient, Diaz Colon     bsps   RELATIONSHIP SELF       Abuse Screening:  Abuse Screening Questionnaire 8/7/2019   Do you ever feel afraid of your partner? N   Are you in a relationship with someone who physically or mentally threatens you? N   Is it safe for you to go home? Y       Fall Risk  Fall Risk Assessment, last 12 mths 8/7/2019   Able to walk? Yes   Fall in past 12 months? No         Coordination of Care:  1. Have you been to the ER, urgent care clinic since your last visit? Hospitalized since your last visit? No    2. Have you seen or consulted any other health care providers outside of the [address] [place] [address] since your last visit? Include any pap smears or colon screening. Yes.  Dr. Nathan Caruso, PCP

## 2019-08-07 NOTE — LETTER
8/7/19 Patient: Anayeli Solorzano YOB: 1950 Date of Visit: 8/7/2019 Patsy Gil MD 
[address] [place] [address] [phone] [id number] VIA Facsimile: [phone] Dear Patsy Gil MD, Thank you for referring Mr. Asha Cunningham to [facility] [place] for evaluation. My notes for this consultation are attached. If you have questions, please do not hesitate to call me. I look forward to following your patient along with you.  
 
 
Sincerely, 
 
Lea Odom, DO

## 2019-08-07 NOTE — PROGRESS NOTES
[facility]  Pulmonary, Critical Care, and Sleep Medicine      Sleep Office Follow up    Name: Darnell Le     : 1950     Date: 2019         Reason For Visit: Follow Up Severe TAWANNA      Assessment:  1. Obstructive Sleep Apnea (TAWANNA): Severe (AHI 30; 2015)- Clinically receiving benefit, still some afternoon fatigue. AHI 6 and patient having some mask leak issues. 2. Sleep maintenance insomnia- much improved- not taking any sleep agents at this time  3. Hypertension  4. Obesity: Body mass index is 37.06 kg/m². 5. History of pulmonary nodule- No recent follow up, - no imaging in our system- unclear if this needed follow up        Plan:  1. Obtain CT Chest to check status of prior report of pulmonary nodule. 2. Continue with APAP for now but settings changed today in clinic from  to 10/20 cwp  3. Patient needs mask refit- may due better with different FFM  4. Renew CPAP supplies  5. Sleep hygiene discussed. 6.  safety discussed  7. Follow up with Primary Care Provider (PCP) as directed and for routine health care maintenance. 8. Weight loss and healthy lifestyle changes encouraged  9. Follow up after CT chest, sooner if needed             HPI:   The patient presents today for routine follow up He states the following:        TAWANNA:    · He has been working on his compliance and putting his mask on when he first gets into bed. · Improved sleep quality  · Feels more rested in the morning but is still experiencing some fatigue in the afternoon  · Reports mild intermittent bloating but very tolerable  · Has been having leak issues with mask- Using FFM- [employer]- mask inspected- headgear is worn out. · No skin breakdown. Insomnia:  His sleeping has improved and has stopped taking sleep aids.     Prior Sleep Agents  - Sonata- no benefit  - RemFresh- works but groggy if does not dedicate at least 7 hours for sleep    Pulmonary Nodule:  - Distant report, unclear if patient had follow up. He has transitioned care out of [facility]. Patient denies any chest pain, sputum production, cough, wheezing or hemoptysis      Viola:  Initial 13/24    Viola Scale 8/7/2019 6/7/2018 3/8/2018 7/25/2017 1/9/2015   Sitting and Reading 1 1 1 0 2   Watching TV 2 2 1 1 3   Sitting, inactive in a public place (e.g. a movie theater or meeting) 1 1 1 0 1   As a passenger in a car for an hour, without a break 1 2 2 1 2   Lying down to rest in the afternoon, when circumstances permit 3 2 2 1 3   Sitting and talking to someone 1 0 0 0 0   Sitting quietly after lunch without alcohol 1 2 1 0 2   In a car, while stopped for a few minutes in traffic 1 0 0 0 0   Viola Sleepiness Score 11 10 8 3 13     3 most recent PHQ Screens 8/7/2019   PHQ Not Done -   Little interest or pleasure in doing things Not at all   Feeling down, depressed, irritable, or hopeless Not at all   Total Score PHQ 2 0     PAP Compliance  Report & Summary Trends  DME: [facility]      Date >4 hr use % time (Total % Use) AHI PAP Rx Pressure @ 95% Median Use Time Leak-Median  (95%)   4/26-7/24/17 18 (64) 4.2 APAP 5/20 14.2 3:36 56.9   12/8/17-3/7/2018 49 (86) 4.4 APAP 5/20 13.2 4:14 73.3   3/9/18-6/6/18 77(89) 3.7 APAP 5/20 15.6 5:01 9.1   05/09/19-08/06/19 84 (100) 6.0 APAP 5/20 16.5 04:44 9.3 (16.5)     Current Outpatient Medications   Medication Sig Dispense Refill    amLODIPine (NORVASC) 5 mg tablet Take 10 mg by mouth daily.  ibuprofen (MOTRIN) 400 mg tablet Take 1 Tab by mouth every eight (8) hours as needed.  cpap machine kit by Does Not Apply route. Overnight Auto CPAP at 5-20 CWP with ramp and humidifier. Mask: Nasal mask. Supplies 99 month. Please send compliance data [id number]. Diagnosis TAWANNA. AHI 30 RDI 30. [employer] order adjustment 1 Kit 0    naproxen (NAPROSYN) 500 mg tablet Take 500 mg by mouth two (2) times daily (with meals).  aspirin delayed-release 81 mg tablet Take 81 mg by mouth daily.       tiZANidine (ZANAFLEX) 4 mg tablet Take 4 mg by mouth every six (6) hours as needed.  acetaminophen (TYLENOL) 650 mg CR tablet Take 650 mg by mouth every six (6) hours as needed for Pain.  aspirin-calcium carbonate 81 mg-300 mg calcium(777 mg) tab Take 81 mg by mouth.  losartan-hydroCHLOROthiazide (HYZAAR) 50-12.5 mg per tablet Take 1 Tab by mouth.  naproxen (NAPROSYN) 500 mg tablet Take  by mouth.  cyclobenzaprine (FLEXERIL) 10 mg tablet Take 10 mg by mouth.  aspirin delayed-release 81 mg tablet Take  by mouth.  amLODIPine (NORVASC) 5 mg tablet Take  by mouth.  acetaminophen (TYLENOL) 500 mg tablet Take 500 mg by mouth every six (6) hours as needed.  OTHER,NON-FORMULARY, 2mg tab Ultramelatonin (REMfresh). Take 1 tab QHS can increase up to 5 tabs QHS 12 Each 0    zaleplon (SONATA) 5 mg capsule Take 1 Cap by mouth nightly. Max Daily Amount: 5 mg. 30 Cap 3    metFORMIN (GLUCOPHAGE) 850 mg tablet Take  by mouth two (2) times daily (with meals).  atorvastatin (LIPITOR) 10 mg tablet Take 10 mg by mouth daily.  white petrolatum-mineral oil (EUCERIN) topical cream Apply  to affected area as needed for Dry Skin.  terbinafine HCl (LAMISIL) 1 % topical cream Apply  to affected area two (2) times a day.  ergocalciferol (ERGOCALCIFEROL) 50,000 unit capsule Take 50,000 Units by mouth.  calcium carbonate-vitamin d2 600-125 mg-unit tab Take  by mouth.  cyclobenzaprine (FLEXERIL) 5 mg tablet Take 5 mg by mouth three (3) times daily as needed for Muscle Spasm(s).  cyclobenzaprine (FLEXERIL) 10 mg tablet Take  by mouth three (3) times daily as needed for Muscle Spasm(s).            Review of Systems:  HEENT: No epistaxis, no nasal drainage, no difficulty in swallowing, no redness in eyes  Respiratory: No cough sob or wheezing  Sleep: As above  Cardiovascular: no chest pain, no palpitations, no chronic leg edema, no syncope  Gastrointestinal: no abdominal pain, some bloating, no vomiting, no diarrhea, no bleeding symptoms  Genitourinary: No urinary symptoms or hematuria  Integument/breast: No ulcers or rashes  Musculoskeletal:Neg  Neurological: No focal weakness, no seizures, no headaches  Behvioral/Psych: No anxiety, no depression  Constitutional: No fever, no chills, no weight loss, no night sweats     Objective:     Visit Vitals  /70 (BP 1 Location: Right arm, BP Patient Position: Sitting)   Pulse 74   Temp 97 °F (36.1 °C) (Oral)   Resp 18   Ht 6' 1\" (1.854 m)   Wt 127.4 kg (280 lb 14.4 oz)   SpO2 98%   BMI 37.06 kg/m²           Physical Exam:   General: comfortable, no acute distress, Obese body habitus  HEENT: pupils reactive, sclera anicteric, EOM intact, Malampati score 3,   Tongue: + Macroglossia, midline  Chin: Micrognathia no  Neck: No adenopathy or thyroid swelling, no lymphadenopathy or JVD, supple  CVS: S1S2 no murmurs  RS: Moderate air movement bilaterally, no accessory muscle use, clear-  No wheezing  Abd: Obeese,soft, non tender, no hepatosplenomegaly palpated  Ext:  No clubbing, edema or cyanosis  Neuro: non focal, awake, alert  Extrm: no leg edema, clubbing or cyanosis  Skin: no rash    Data review:         Prior Sleep Testing  1/21/2015  INDICATION: ESS 13     CONCLUSION     severe obstructive sleep apnea syndrome with AHI of 30, and  respiratory disturbance index (RDI) of 30. A very successful CPAP titration with elimination of obstructive  sleep apnea and associated hypoxemia. At 19 CPAP AHI reduced to  0, RDI reduced to 0 REM supine sleep seen, REM seen. Snore index  4, Arousal index 8    Lowest oxygen saturation 74%. At optimal pressure oxygen  saturation 93-95%. Positional and REM association to respiratory events. No significant LM related arousals seen. 1 LM arousal index  improved to 0 on therapy. At 12 CPAP AHI reduced to 0, RDI reduced to 0 REM supine sleep  seen, REM seen. Snore Index 404, Arousal Index 3.     Sleep efficiency during diagnostic portion 91 with 144 min of  sleep and treatment portion 52 with 187 min sleep    Average heart rate 74-84, Arrythmia none occasional PVCs seen  also elevated BP noted during surgery. RECOMMENDATIONS     Overnight CPAP at 19 CWP with ramp and humidifier. Mask: Quattro Mirage Large or mask of choice. If issues with pressure tolerance CPAP 12 is consider optimal.    If continue to have symptoms and issues with AHI as other CPAP  13,14 were good but noted hypopnea at higher pressure consider  Auto PAP 5-20 or re titration.                Shivani Medrano DO, [facility]  Pulmonary, Sleep, Critical Care Medicine

## 2019-08-15 ENCOUNTER — HOSPITAL ENCOUNTER (OUTPATIENT)
Dept: CT IMAGING | Age: 69
Discharge: HOME | End: 2019-08-15
Attending: INTERNAL MEDICINE
Payer: MEDICARE

## 2019-08-15 DIAGNOSIS — R91.1 PULMONARY NODULE: ICD-10-CM

## 2019-08-15 LAB — CREAT UR-MCNC: 1.1 MG/DL (ref 0.6–1.3)

## 2019-08-15 PROCEDURE — 71260 CT THORAX DX C+: CPT

## 2019-08-15 PROCEDURE — 74011636320 HC RX REV CODE- 636/320: Performed by: INTERNAL MEDICINE

## 2019-08-15 PROCEDURE — 82565 ASSAY OF CREATININE: CPT

## 2019-08-15 RX ADMIN — IOPAMIDOL 100 ML: 612 INJECTION, SOLUTION INTRAVENOUS at 13:23

## 2019-09-03 ENCOUNTER — TELEPHONE (OUTPATIENT)
Dept: PULMONOLOGY | Age: 69
End: 2019-09-03

## 2019-09-03 NOTE — TELEPHONE ENCOUNTER
Called and left message- CT stable with some scarring. No concerning findings    CT Results (most recent):  Results from Hospital Encounter encounter on 08/15/19   CT CHEST W CONT    Narrative EXAM: CT chest     INDICATION: Follow-up lung scarring. COMPARISON: Outside hospital ct chest 1/27/2016    TECHNIQUE: Axial CT imaging from the thoracic inlet through the diaphragm with  intravenous contrast. Multiplanar reformats were generated. One or more dose  reduction techniques were used on this CT: automated exposure control,  adjustment of the mAs and/or kVp according to patient size, and iterative  reconstruction techniques. The specific techniques used on this CT exam have  been documented in the patient's electronic medical record. Digital Imaging and  Communications in Medicine (DICOM) format image data are available to  nonaffiliated external healthcare facilities or entities on a secured, media  free, reciprocally searchable basis with patient authorization for at least a  12-month period after this study. _______________    FINDINGS:    BASE OF THE NECK: Normal.    LUNGS: Scarring/atelectasis is again noted in both lower lungs. A subpleural 0.4  cm nodule is present in the lateral aspect of the left lower lung, unchanged in  appearance since prior examination. There is no new pulmonary nodule. No focal  consolidation. AIRWAY: Normal.    PLEURA: Normal.    MEDIASTINUM: Normal heart size. No pericardial effusion. Vasculature is  unremarkable. LYMPH NODES: No enlarged lymph nodes. UPPER ABDOMEN: Scattered hepatic cysts are present. Scattered bilateral renal  cysts are present. BONES: No acute or aggressive osseous abnormalities identified. Status post  median sternotomy. Postsurgical changes are present within the upper thoracic  spine. OTHER: None.    _______________      Impression IMPRESSION:    1. Stable scarring/atelectasis in both lower lungs. No suspicious pulmonary  nodule.  No focal consolidation.  No acute process in the chest.         David Nayak DO, [facility]    [facility]  Pulmonary, Critical Care, and Sleep Medicine

## 2019-11-29 ENCOUNTER — OFFICE VISIT (OUTPATIENT)
Dept: PULMONOLOGY | Age: 69
End: 2019-11-29

## 2019-11-29 VITALS
RESPIRATION RATE: 18 BRPM | HEART RATE: 89 BPM | BODY MASS INDEX: 37 KG/M2 | TEMPERATURE: 97.9 F | HEIGHT: 73 IN | SYSTOLIC BLOOD PRESSURE: 138 MMHG | WEIGHT: 279.2 LBS | OXYGEN SATURATION: 96 % | DIASTOLIC BLOOD PRESSURE: 66 MMHG

## 2019-11-29 DIAGNOSIS — G47.33 OSA (OBSTRUCTIVE SLEEP APNEA): Primary | ICD-10-CM

## 2019-11-29 DIAGNOSIS — Z23 ENCOUNTER FOR IMMUNIZATION: ICD-10-CM

## 2019-11-29 DIAGNOSIS — I10 ESSENTIAL HYPERTENSION: ICD-10-CM

## 2019-11-29 DIAGNOSIS — E66.9 OBESITY (BMI 30-39.9): ICD-10-CM

## 2019-11-29 RX ORDER — POLYETHYLENE GLYCOL 3350, SODIUM SULFATE ANHYDROUS, SODIUM BICARBONATE, SODIUM CHLORIDE, POTASSIUM CHLORIDE 236; 22.74; 6.74; 5.86; 2.97 G/4L; G/4L; G/4L; G/4L; G/4L
POWDER, FOR SOLUTION ORAL
COMMUNITY
Start: 2019-09-03 | End: 2019-11-29

## 2019-11-29 NOTE — PROGRESS NOTES
[facility]  Pulmonary, Critical Care, and Sleep Medicine      Sleep Office Follow up    Name: Maycol Mancilla     : 1950     Date: 2019         Reason For Visit: Follow Up Severe TAWANNA      Assessment:  1. Obstructive Sleep Apnea (TAWANNA): Severe (AHI 30; )- Clinically receiving benefit, previously report afternoon fatigue now resolved. 5 minutes of  noted on this most recent compliance report. Unclear if this is artifact or something else. ROS negative for any cardiac symptoms. He does note some right, posterior neck pain that appears related to prior report of C-spine fusion procedure. 2. Sleep maintenance insomnia- much improved- not taking any sleep agents at this time  3. Hypertension  4. Obesity: There is no height or weight on file to calculate BMI. Plan:  1. Continue with APAP at 10/20 cwp  2. Follow up in 30 days to reassess possible  finding- if persistent or increased will order cardiac echo. 3. Renew CPAP supplies  4. Sleep hygiene discussed. 5.  safety discussed  6. Weight loss and healthy lifestyle changes encouraged  7. Follow up with Primary Care Provider (PCP) as directed and for routine health care maintenance. HPI:   The patient presents today for routine follow up He states the following:        TAWANNA:    · He continues to report clinica benefit. Fatigue reported at last visit not resolved. · No mask difficulties- did get a mask refit and likes his new mask and headgear  · No skin breakdown. · No aerophagia or bloating. Other:  · Does report some neck discomfort which he attributes to a report of C-spine fusion procedure at [facility] ([address]. [place]      Insomnia:  · Currently not an issue  · His sleeping has improved and has previously stopped taking sleep aids.     Prior Sleep Agents  - Sonata- no benefit  - RemFresh- works but groggy if does not dedicate at least 7 hours for sleep    Pulmonary Nodule:  - No findings of pulmonary nodule on CT Chest ordered at last visit      Hebron:  Initial 13/24    Hebron Scale 8/7/2019 6/7/2018 3/8/2018 7/25/2017 1/9/2015   Sitting and Reading 1 1 1 0 2   Watching TV 2 2 1 1 3   Sitting, inactive in a public place (e.g. a movie theater or meeting) 1 1 1 0 1   As a passenger in a car for an hour, without a break 1 2 2 1 2   Lying down to rest in the afternoon, when circumstances permit 3 2 2 1 3   Sitting and talking to someone 1 0 0 0 0   Sitting quietly after lunch without alcohol 1 2 1 0 2   In a car, while stopped for a few minutes in traffic 1 0 0 0 0   [place] Sleepiness Score 11 10 8 3 13     3 most recent PHQ Screens 8/7/2019   PHQ Not Done -   Little interest or pleasure in doing things Not at all   Feeling down, depressed, irritable, or hopeless Not at all   Total Score PHQ 2 0     PAP Compliance  Report & Summary Trends  DME: [facility]    Date >4 hr use % time (Total % Use) AHI PAP Rx Pressure @ 95% Median Use Time Leak-Median  (95%) Notes   4/26-7/24/17 18 (64) 4.2 APAP 5/20 14.2 3:36 56.9    12/8/17-3/7/2018 49 (86) 4.4 APAP 5/20 13.2 4:14 73.3    3/9/18-6/6/18 77(89) 3.7 APAP 5/20 15.6 5:01 9.1    05/09/19-08/06/19 84 (100) 6.0 APAP 5/20 16.5 04:44 9.3 (16.5) : 0%- None   08/31/19-11/28/19 84 (97) 5.3 APAP 10/20 18.4 05:00 11.7 (20.1) : 2%- 5 min                                   Current Outpatient Medications   Medication Sig Dispense Refill    amLODIPine (NORVASC) 5 mg tablet Take 10 mg by mouth daily.  ibuprofen (MOTRIN) 400 mg tablet Take 1 Tab by mouth every eight (8) hours as needed.  cpap machine kit by Does Not Apply route. Overnight Auto CPAP at 5-20 CWP with ramp and humidifier. Mask: Nasal mask. Supplies 99 month. Please send compliance data [id number]. Diagnosis TAWANNA. AHI 30 RDI 30. [facility] order adjustment 1 Kit 0    naproxen (NAPROSYN) 500 mg tablet Take 500 mg by mouth two (2) times daily (with meals).       aspirin delayed-release 81 mg tablet Take 81 mg by mouth daily.  tiZANidine (ZANAFLEX) 4 mg tablet Take 4 mg by mouth every six (6) hours as needed.  acetaminophen (TYLENOL) 650 mg CR tablet Take 650 mg by mouth every six (6) hours as needed for Pain. Review of Systems:  HEENT: No epistaxis, no nasal drainage, no difficulty in swallowing, no redness in eyes  Respiratory: No cough sob or wheezing  Sleep: As above  Cardiovascular: no chest pain, no palpitations, no chronic leg edema, no syncope  Gastrointestinal: no abdominal pain, some bloating, no vomiting, no diarrhea, no bleeding symptoms  Genitourinary: No urinary symptoms or hematuria  Integument/breast: No ulcers or rashes  Musculoskeletal:Neg  Neurological: No focal weakness, no seizures, no headaches  Behvioral/Psych: No anxiety, no depression  Constitutional: No fever, no chills, no weight loss, no night sweats     Objective: There were no vitals taken for this visit. Physical Exam:   General:  no acute distress, calm, comfortable, + Obese body habitus  HEENT: pupils reactive, sclera anicteric, EOM intact, Malampati score 3,   Tongue: + Macroglossia, midline  Neck: Non tender, no adenopathy or thyroid swelling,  No JVD  CVS: Regular rate add rythmn,  no murmurs  RS: Moderate air movement bilaterally, no accessory muscle use, clear-  No wheezing  Abd: Obeese, soft, non tender   Ext:  No clubbing, edema or cyanosis  Neuro: non focal, awake, alert, normal muscle tone  Extrm: no leg edema, clubbing or cyanosis  Skin: no rash    Data review:     CT Chest:  CT Results (most recent):  Results from Hospital Encounter encounter on 08/15/19   CT CHEST W CONT    Narrative EXAM: CT chest     INDICATION: Follow-up lung scarring. COMPARISON: Outside hospital ct chest 1/27/2016    TECHNIQUE: Axial CT imaging from the thoracic inlet through the diaphragm with  intravenous contrast. Multiplanar reformats were generated.  One or more dose  reduction techniques were used on this CT: automated exposure control,  adjustment of the mAs and/or kVp according to patient size, and iterative  reconstruction techniques. The specific techniques used on this CT exam have  been documented in the patient's electronic medical record. Digital Imaging and  Communications in Medicine (DICOM) format image data are available to  nonaffiliated external healthcare facilities or entities on a secured, media  free, reciprocally searchable basis with patient authorization for at least a  12-month period after this study. _______________    FINDINGS:    BASE OF THE NECK: Normal.    LUNGS: Scarring/atelectasis is again noted in both lower lungs. A subpleural 0.4  cm nodule is present in the lateral aspect of the left lower lung, unchanged in  appearance since prior examination. There is no new pulmonary nodule. No focal  consolidation. AIRWAY: Normal.    PLEURA: Normal.    MEDIASTINUM: Normal heart size. No pericardial effusion. Vasculature is  unremarkable. LYMPH NODES: No enlarged lymph nodes. UPPER ABDOMEN: Scattered hepatic cysts are present. Scattered bilateral renal  cysts are present. BONES: No acute or aggressive osseous abnormalities identified. Status post  median sternotomy. Postsurgical changes are present within the upper thoracic  spine. OTHER: None.    _______________      Impression IMPRESSION:    1. Stable scarring/atelectasis in both lower lungs. No suspicious pulmonary  nodule. No focal consolidation. No acute process in the chest.         Prior Sleep Testing  1/21/2015  INDICATION: ESS 13     CONCLUSION     severe obstructive sleep apnea syndrome with AHI of 30, and  respiratory disturbance index (RDI) of 30. A very successful CPAP titration with elimination of obstructive  sleep apnea and associated hypoxemia. At 19 CPAP AHI reduced to  0, RDI reduced to 0 REM supine sleep seen, REM seen.  Snore index  4, Arousal index 8    Lowest oxygen saturation 74%. At optimal pressure oxygen  saturation 93-95%. Positional and REM association to respiratory events. No significant LM related arousals seen. 1 LM arousal index  improved to 0 on therapy. At 12 CPAP AHI reduced to 0, RDI reduced to 0 REM supine sleep  seen, REM seen. Snore Index 404, Arousal Index 3. Sleep efficiency during diagnostic portion 91 with 144 min of  sleep and treatment portion 52 with 187 min sleep    Average heart rate 74-84, Arrythmia none occasional PVCs seen  also elevated BP noted during surgery. RECOMMENDATIONS     Overnight CPAP at 19 CWP with ramp and humidifier. Mask: Quattro Mirage Large or mask of choice. If issues with pressure tolerance CPAP 12 is consider optimal.    If continue to have symptoms and issues with AHI as other CPAP  13,14 were good but noted hypopnea at higher pressure consider  Auto PAP 5-20 or re titration.                Yue Vidales DO, [facility]  Pulmonary, Sleep, Critical Care Medicine

## 2019-11-29 NOTE — LETTER
11/29/19 Patient: Noah Chaudhry YOB: 1950 Date of Visit: 11/29/2019 Gary Arellano MD 
[id number] [address] [place] [address] VIA Facsimile: [phone] Dear Gary Arellano MD, Thank you for referring Mr. Laury Raymond to [address] [place] [address] for evaluation. My notes for this consultation are attached. If you have questions, please do not hesitate to call me. I look forward to following your patient along with you.  
 
 
Sincerely, 
 
Ghanshyam Deaalexsander, DO

## 2019-11-29 NOTE — PROGRESS NOTES
Darwin Pierce presents today for   Chief Complaint   Patient presents with    Lung Nodule     follow up from 8/7/2019    Sleep Apnea    Results     CT & labs 8/15/2019       Is someone accompanying this pt? No    Is the patient using any DME equipment during OV? Yes. CPAP machine    -[employer]    Depression Screening:  3 most recent PHQ Screens 11/29/2019   PHQ Not Done -   Little interest or pleasure in doing things Not at all   Feeling down, depressed, irritable, or hopeless Not at all   Total Score PHQ 2 0       Learning Assessment:  Learning Assessment 7/25/2017   PRIMARY LEARNER Patient   HIGHEST LEVEL OF EDUCATION - PRIMARY LEARNER  > 4 YEARS OF COLLEGE   PRIMARY LANGUAGE ENGLISH   LEARNER PREFERENCE PRIMARY READING     DEMONSTRATION   ANSWERED BY patient, Star Ania     bsps   RELATIONSHIP SELF       Abuse Screening:  Abuse Screening Questionnaire 8/7/2019   Do you ever feel afraid of your partner? N   Are you in a relationship with someone who physically or mentally threatens you? N   Is it safe for you to go home? Y       Fall Risk  Fall Risk Assessment, last 12 mths 11/29/2019   Able to walk? Yes   Fall in past 12 months? No         Coordination of Care:  1. Have you been to the ER, urgent care clinic since your last visit? Hospitalized since your last visit? No    2. Have you seen or consulted any other health care providers outside of the [address] [place] [address] since your last visit? Include any pap smears or colon screening.  No

## 2019-12-03 NOTE — PROGRESS NOTES
Carole Kunz is a 81 y.o. male who presents for routine immunizations. He denies any symptoms , reactions or allergies that would exclude them from being immunized today. Risks and adverse reactions were discussed and the VIS was given to them. All questions were addressed. He was observed for 10 min post injection. There were no reactions observed.     Chandrika Nagel

## 2020-01-17 ENCOUNTER — OFFICE VISIT (OUTPATIENT)
Dept: PULMONOLOGY | Age: 70
End: 2020-01-17

## 2020-01-17 VITALS
BODY MASS INDEX: 37.32 KG/M2 | HEART RATE: 94 BPM | SYSTOLIC BLOOD PRESSURE: 158 MMHG | HEIGHT: 73 IN | OXYGEN SATURATION: 96 % | RESPIRATION RATE: 20 BRPM | DIASTOLIC BLOOD PRESSURE: 74 MMHG | WEIGHT: 281.6 LBS | TEMPERATURE: 95.4 F

## 2020-01-17 DIAGNOSIS — I10 ESSENTIAL HYPERTENSION: ICD-10-CM

## 2020-01-17 DIAGNOSIS — I77.9 DISORDER OF CAROTID ARTERY (HCC): ICD-10-CM

## 2020-01-17 DIAGNOSIS — R06.3 CHEYNE-STOKES BREATHING: ICD-10-CM

## 2020-01-17 DIAGNOSIS — E66.9 OBESITY (BMI 30-39.9): ICD-10-CM

## 2020-01-17 DIAGNOSIS — G47.33 OSA (OBSTRUCTIVE SLEEP APNEA): Primary | ICD-10-CM

## 2020-01-17 NOTE — PROGRESS NOTES
[facility]  Pulmonary, Critical Care, and Sleep Medicine      Sleep Office Follow up    Name: Uriel Mcfadden     : 1950     Date: 2020         Reason For Visit: Follow Up Severe TAWANNA and APAP Therapy      Assessment:  1. Obstructive Sleep Apnea (TAWANNA): Severe (AHI 30; )- Clinically receiving benefit, previously report afternoon fatigue now resolved. 5 minutes of  noted on this most recent compliance report. Unclear if this is artifact or something else. ROS negative for any cardiac symptoms. He does note some right, posterior neck pain that appears related to prior report of C-spine fusion procedure. 2. Sleep maintenance insomnia- much improved- not taking any sleep agents at this time  3. Hypertension- Norvasc  4. Prominent carotid pulsations -Right: no bruit on auscultation: unclear if normal due to post surgical changes vs other process  5. Obesity: Body mass index is 37.15 kg/m². 6. 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 Continue with APAP at 10/20 cwp  2. Obtain Cardiac Echo to assess EF due to  noted on compliance report. No history of suggest CHF process  3. Obtain carotid US- due to brink carotid pulsations noted on today's exam.  4. Renew CPAP supplies  5. Sleep hygiene discussed. 6.  safety discussed  7. Weight loss and healthy lifestyle changes encouraged  8. Follow up with Primary Care Provider (PCP) as directed and for routine health care maintenance. 9. Follow up in 6 months or sooner  pending above testing and clinical course             HPI:   The patient presents today for routine follow up He states the following:        TAWANNA/Sleep:    · He continues to report clinical benefit. · Reports good sleep qualtiy  · No mask difficulties  · No skin breakdown. · No aerophagia or bloating. · No orthopnea, gasping at night.   · No chest pain, no jaw pain  · No edema, dizziness, syncope or presyncope  · Sleep time normally around [id number]  · Still with minimal report of  on compliance report  · Reports continues proper PAP hygiene care  ·     Other:  · Does report some neck discomfort which he attributes to a report of C-spine fusion and resection of cervical nerve sheath tumor procedure at [facility] ([address]). The patient brought copies of this procedure which we will scan into the EMR      Insomnia:  · Currently not an issue  · His sleeping has improved and has previously stopped taking sleep aids.     Prior Sleep Agents  - Sonata- no benefit  - RemFresh- works but groggy if does not dedicate at least 7 hours for sleep    Pulmonary Nodule:  - No findings of pulmonary nodule on CT Chest ordered at last visit      Riley:  Initial 13/24    Riley Scale 11/29/2019 8/7/2019 6/7/2018 3/8/2018 7/25/2017 1/9/2015   Sitting and Reading 1 1 1 1 0 2   Watching TV 1 2 2 1 1 3   Sitting, inactive in a public place (e.g. a movie theater or meeting) 0 1 1 1 0 1   As a passenger in a car for an hour, without a break 1 1 2 2 1 2   Lying down to rest in the afternoon, when circumstances permit 2 3 2 2 1 3   Sitting and talking to someone 0 1 0 0 0 0   Sitting quietly after lunch without alcohol 2 1 2 1 0 2   In a car, while stopped for a few minutes in traffic 0 1 0 0 0 0   Riley Sleepiness Score 7 11 10 8 3 13     3 most recent PHQ Screens 1/17/2020   PHQ Not Done -   Little interest or pleasure in doing things Not at all   Feeling down, depressed, irritable, or hopeless Not at all   Total Score PHQ 2 0     PAP Compliance  Report & Summary Trends  DME: [facility]    Date >4 hr use % time (Total % Use) AHI PAP Rx Pressure @ 95% Median Use Time Leak-Median  (95%) Notes   4/26-7/24/17 18 (64) 4.2 APAP 5/20 14.2 3:36 56.9    12/8/17-3/7/2018 49 (86) 4.4 APAP 5/20 13.2 4:14 73.3    3/9/18-6/6/18 77(89) 3.7 APAP 5/20 15.6 5:01 9.1    05/09/19-08/06/19 84 (100) 6.0 APAP 5/20 16.5 04: 44 9.3 (16.5) : 0%- None   08/31/19-11/28/19 84 (97) 5.3 APAP 10/20 18.4 05:00 11.7 (20.1) : 2%- 5 min   10/19/19- 01/16/20 78 (97) 5.0 APAP 10/20 18.4 04:54 12.6 (21.0) : 1%- 3 min                         Current Outpatient Medications   Medication Sig Dispense Refill    amLODIPine (NORVASC) 5 mg tablet Take 10 mg by mouth daily.  ibuprofen (MOTRIN) 400 mg tablet Take 1 Tab by mouth every eight (8) hours as needed.  cpap machine kit by Does Not Apply route. Overnight Auto CPAP at 5-20 CWP with ramp and humidifier. Mask: Nasal mask. Supplies 99 month. Please send compliance data [id number]. Diagnosis TAWANNA. AHI 30 RDI 30. [facility] order adjustment 1 Kit 0    naproxen (NAPROSYN) 500 mg tablet Take 500 mg by mouth two (2) times daily (with meals).  aspirin delayed-release 81 mg tablet Take 81 mg by mouth daily.  tiZANidine (ZANAFLEX) 4 mg tablet Take 4 mg by mouth every six (6) hours as needed.  acetaminophen (TYLENOL) 650 mg CR tablet Take 650 mg by mouth every six (6) hours as needed for Pain.            Review of Systems:  HEENT: No epistaxis, no nasal drainage, no difficulty in swallowing, no redness in eyes  Respiratory: No cough sob or wheezing  Sleep: As above  Cardiovascular: no chest pain, no palpitations, no chronic leg edema, no syncope  Gastrointestinal: no abdominal pain, some bloating, no vomiting, no diarrhea, no bleeding symptoms  Genitourinary: No urinary symptoms or hematuria  Integument/breast: No ulcers or rashes  Musculoskeletal:Neg  Neurological: No focal weakness, no seizures, no headaches  Behvioral/Psych: No anxiety, no depression  Constitutional: No fever, no chills, no weight loss, no night sweats     Objective:     Visit Vitals  Pulse 94   Temp 95.4 °F (35.2 °C) (Oral)   Resp 20   Ht 6' 1\" (1.854 m)   Wt 127.7 kg (281 lb 9.6 oz)   SpO2 96%   BMI 37.15 kg/m²         Physical Exam:   General:  no acute distress, calm, comfortable, + Obese body habitus  HEENT: pupils reactive, sclera anicteric, EOM intact, Malampati score 3,   Tongue: + Macroglossia, midline  Neck: Non tender, no adenopathy or thyroid swelling,  No JVD, right carotid with brisk pulsations - this is also near prior surgical site for C-spine surgery. No Bruit heard  CVS: Regular rate add rythmn,  no murmurs  RS: Moderate air movement bilaterally, no accessory muscle use, clear-  No wheezing  Abd: Obeese, soft, non tender   Ext:  No clubbing, edema or cyanosis  Neuro: non focal, awake, alert, normal muscle tone  Extrm: no leg edema, clubbing or cyanosis  Skin: no rash    Data review:     CT Chest:  CT Results (most recent):  Results from Hospital Encounter encounter on 08/15/19   CT CHEST W CONT    Narrative EXAM: CT chest     INDICATION: Follow-up lung scarring. COMPARISON: Outside hospital ct chest 1/27/2016    TECHNIQUE: Axial CT imaging from the thoracic inlet through the diaphragm with  intravenous contrast. Multiplanar reformats were generated. One or more dose  reduction techniques were used on this CT: automated exposure control,  adjustment of the mAs and/or kVp according to patient size, and iterative  reconstruction techniques. The specific techniques used on this CT exam have  been documented in the patient's electronic medical record. Digital Imaging and  Communications in Medicine (DICOM) format image data are available to  nonaffiliated external healthcare facilities or entities on a secured, media  free, reciprocally searchable basis with patient authorization for at least a  12-month period after this study. _______________    FINDINGS:    BASE OF THE NECK: Normal.    LUNGS: Scarring/atelectasis is again noted in both lower lungs. A subpleural 0.4  cm nodule is present in the lateral aspect of the left lower lung, unchanged in  appearance since prior examination. There is no new pulmonary nodule. No focal  consolidation.     AIRWAY: Normal.    PLEURA: Normal.    MEDIASTINUM: Normal heart size. No pericardial effusion. Vasculature is  unremarkable. LYMPH NODES: No enlarged lymph nodes. UPPER ABDOMEN: Scattered hepatic cysts are present. Scattered bilateral renal  cysts are present. BONES: No acute or aggressive osseous abnormalities identified. Status post  median sternotomy. Postsurgical changes are present within the upper thoracic  spine. OTHER: None.    _______________      Impression IMPRESSION:    1. Stable scarring/atelectasis in both lower lungs. No suspicious pulmonary  nodule. No focal consolidation. No acute process in the chest.         Prior Sleep Testing  1/21/2015  INDICATION: ESS 13     CONCLUSION     severe obstructive sleep apnea syndrome with AHI of 30, and  respiratory disturbance index (RDI) of 30. A very successful CPAP titration with elimination of obstructive  sleep apnea and associated hypoxemia. At 19 CPAP AHI reduced to  0, RDI reduced to 0 REM supine sleep seen, REM seen. Snore index  4, Arousal index 8    Lowest oxygen saturation 74%. At optimal pressure oxygen  saturation 93-95%. Positional and REM association to respiratory events. No significant LM related arousals seen. 1 LM arousal index  improved to 0 on therapy. At 12 CPAP AHI reduced to 0, RDI reduced to 0 REM supine sleep  seen, REM seen. Snore Index 404, Arousal Index 3. Sleep efficiency during diagnostic portion 91 with 144 min of  sleep and treatment portion 52 with 187 min sleep    Average heart rate 74-84, Arrythmia none occasional PVCs seen  also elevated BP noted during surgery. RECOMMENDATIONS     Overnight CPAP at 19 CWP with ramp and humidifier. Mask: Quattro Mirage Large or mask of choice. If issues with pressure tolerance CPAP 12 is consider optimal.    If continue to have symptoms and issues with AHI as other CPAP  13,14 were good but noted hypopnea at higher pressure consider  Auto PAP 5-20 or re titration.                Rudy Bah DO, FCCP  Pulmonary, Sleep, Critical Care Medicine

## 2020-01-17 NOTE — PROGRESS NOTES
Rosa Merino presents today for   Chief Complaint   Patient presents with    Lung Nodule    Sleep Apnea       Is someone accompanying this pt? No     Is the patient using any DME equipment during OV? cpap     -[employer]     Depression Screening:  3 most recent PHQ Screens 1/17/2020   PHQ Not Done -   Little interest or pleasure in doing things Not at all   Feeling down, depressed, irritable, or hopeless Not at all   Total Score PHQ 2 0       Learning Assessment:  Learning Assessment 7/25/2017   PRIMARY LEARNER Patient   HIGHEST LEVEL OF EDUCATION - PRIMARY LEARNER  > 4 YEARS OF COLLEGE   PRIMARY LANGUAGE ENGLISH   LEARNER PREFERENCE PRIMARY READING     DEMONSTRATION   ANSWERED BY patient, Romaine Saeed     bsps   RELATIONSHIP SELF       Abuse Screening:  Abuse Screening Questionnaire 8/7/2019   Do you ever feel afraid of your partner? N   Are you in a relationship with someone who physically or mentally threatens you? N   Is it safe for you to go home? Y       Fall Risk  Fall Risk Assessment, last 12 mths 1/17/2020   Able to walk? Yes   Fall in past 12 months? No         Coordination of Care:  1. Have you been to the ER, urgent care clinic since your last visit? Hospitalized since your last visit? No    2. Have you seen or consulted any other health care providers outside of the [address] [place] [address] since your last visit? Include any pap smears or colon screening.  Dr Kiki Simpson PCP [facility]

## 2020-01-17 NOTE — LETTER
1/17/20 Patient: Keith Nurse YOB: 1950 Date of Visit: 1/17/2020 Tyree Larson MD 
[address] [address] VIA Facsimile: [phone] Dear Tyree Larson MD, Thank you for referring Mr. Issac Russell to [address] [place] [address] for evaluation. My notes for this consultation are attached. If you have questions, please do not hesitate to call me. I look forward to following your patient along with you.  
 
 
Sincerely, 
 
Corby Eddy, DO

## 2020-01-29 ENCOUNTER — HOSPITAL ENCOUNTER (OUTPATIENT)
Dept: NON INVASIVE DIAGNOSTICS | Age: 70
Discharge: HOME | End: 2020-01-29
Attending: INTERNAL MEDICINE
Payer: MEDICARE

## 2020-01-29 ENCOUNTER — HOSPITAL ENCOUNTER (OUTPATIENT)
Dept: VASCULAR SURGERY | Age: 70
Discharge: HOME | End: 2020-01-29
Attending: INTERNAL MEDICINE
Payer: MEDICARE

## 2020-01-29 VITALS
SYSTOLIC BLOOD PRESSURE: 126 MMHG | WEIGHT: 281 LBS | BODY MASS INDEX: 37.24 KG/M2 | DIASTOLIC BLOOD PRESSURE: 76 MMHG | HEIGHT: 73 IN

## 2020-01-29 DIAGNOSIS — R06.3 CHEYNE-STOKES BREATHING: ICD-10-CM

## 2020-01-29 DIAGNOSIS — I77.9 DISORDER OF CAROTID ARTERY (HCC): ICD-10-CM

## 2020-01-29 LAB
AV VELOCITY RATIO: 0.91
AV VTI RATIO: 1.7
ECHO AO ASC DIAM: 3.72 CM
ECHO AO ROOT DIAM: 3.61 CM
ECHO AV AREA PEAK VELOCITY: 3.9 CM2
ECHO AV AREA VTI: 7.4 CM2
ECHO AV AREA/BSA PEAK VELOCITY: 1.5 CM2/M2
ECHO AV AREA/BSA VTI: 2.9 CM2/M2
ECHO AV MEAN GRADIENT: 5 MMHG
ECHO AV MEAN VELOCITY: 1.09 M/S
ECHO AV PEAK GRADIENT: 7.6 MMHG
ECHO AV PEAK VELOCITY: 137.54 CM/S
ECHO AV VTI: 15.54 CM
ECHO LA MAJOR AXIS: 3.31 CM
ECHO LA VOL 2C: 39.47 ML (ref 18–58)
ECHO LA VOL 4C: 29.87 ML (ref 18–58)
ECHO LA VOL BP: 35.68 ML (ref 18–58)
ECHO LA VOL/BSA BIPLANE: 14.35 ML/M2 (ref 16–28)
ECHO LA VOLUME INDEX A2C: 15.87 ML/M2 (ref 16–28)
ECHO LA VOLUME INDEX A4C: 12.01 ML/M2 (ref 16–28)
ECHO LV E' LATERAL VELOCITY: 7 CM/S
ECHO LV E' SEPTAL VELOCITY: 5 CM/S
ECHO LV EDV A2C: 78 ML
ECHO LV EDV A4C: 93.3 ML
ECHO LV EDV BP: 89.7 ML (ref 67–155)
ECHO LV EDV INDEX A4C: 37.5 ML/M2
ECHO LV EDV INDEX BP: 36.1 ML/M2
ECHO LV EDV NDEX A2C: 31.4 ML/M2
ECHO LV EDV TEICHHOLZ: 35.24 ML
ECHO LV EJECTION FRACTION A2C: 62 %
ECHO LV EJECTION FRACTION A4C: 56 %
ECHO LV EJECTION FRACTION BIPLANE: 60.7 % (ref 55–100)
ECHO LV ESV A2C: 30.1 ML
ECHO LV ESV A4C: 40.6 ML
ECHO LV ESV BP: 35.3 ML (ref 22–58)
ECHO LV ESV INDEX A2C: 12.1 ML/M2
ECHO LV ESV INDEX A4C: 16.3 ML/M2
ECHO LV ESV INDEX BP: 14.2 ML/M2
ECHO LV ESV TEICHHOLZ: 15.04 ML
ECHO LV INTERNAL DIMENSION DIASTOLIC: 4.45 CM (ref 4.2–5.9)
ECHO LV INTERNAL DIMENSION SYSTOLIC: 3.12 CM
ECHO LV IVSD: 1.39 CM (ref 0.6–1)
ECHO LV MASS 2D: 259 G (ref 88–224)
ECHO LV MASS INDEX 2D: 104.2 G/M2 (ref 49–115)
ECHO LV POSTERIOR WALL DIASTOLIC: 1.2 CM (ref 0.6–1)
ECHO LVOT DIAM: 2.33 CM
ECHO LVOT PEAK GRADIENT: 6.3 MMHG
ECHO LVOT PEAK VELOCITY: 125.55 CM/S
ECHO LVOT VTI: 27.12 CM
ECHO MV A VELOCITY: 48.31 CM/S
ECHO MV AREA PHT: 2.6 CM2
ECHO MV E DECELERATION TIME (DT): 296.8 MS
ECHO MV E VELOCITY: 54.24 CM/S
ECHO MV E/A RATIO: 1.12
ECHO MV E/E' LATERAL: 7.75
ECHO MV E/E' RATIO (AVERAGED): 9.3
ECHO MV E/E' SEPTAL: 10.85
ECHO MV PRESSURE HALF TIME (PHT): 86.1 MS
LEFT CCA DIST DIAS: 18.7 CM/S
LEFT CCA DIST SYS: 86.6 CM/S
LEFT CCA PROX DIAS: 18.9 CM/S
LEFT CCA PROX SYS: 131.2 CM/S
LEFT ECA DIAS: 7.43 CM/S
LEFT ECA SYS: 94.7 CM/S
LEFT ICA DIST DIAS: 24.8 CM/S
LEFT ICA DIST SYS: 93 CM/S
LEFT ICA MID DIAS: 19.4 CM/S
LEFT ICA MID SYS: 56.7 CM/S
LEFT ICA PROX DIAS: 15.5 CM/S
LEFT ICA PROX SYS: 76.9 CM/S
LEFT ICA/CCA SYS: 1.07
LEFT SUBCLAVIAN DIAS: 7.09 CM/S
LEFT SUBCLAVIAN SYS: 170.6 CM/S
LEFT VERTEBRAL DIAS: 10.99 CM/S
LEFT VERTEBRAL SYS: 45 CM/S
LVFS 2D: 29.95 %
LVOT MG: 5.01 MMHG
LVOT MV: 1.1 CM/S
LVSV (MOD BI): 21.25 ML
LVSV (MOD SINGLE 4C): 20.56 ML
LVSV (MOD SINGLE): 18.72 ML
LVSV (TEICH): 20.19 ML
MV DEC SLOPE: 1.83
RIGHT CCA DIST DIAS: 15 CM/S
RIGHT CCA DIST SYS: 91.9 CM/S
RIGHT CCA PROX DIAS: 10.7 CM/S
RIGHT CCA PROX SYS: 140.8 CM/S
RIGHT ECA DIAS: 9.06 CM/S
RIGHT ECA SYS: 117.4 CM/S
RIGHT ICA DIST DIAS: 23.7 CM/S
RIGHT ICA DIST SYS: 62.2 CM/S
RIGHT ICA MID DIAS: 18.3 CM/S
RIGHT ICA MID SYS: 65.5 CM/S
RIGHT ICA PROX DIAS: 11.7 CM/S
RIGHT ICA PROX SYS: 64.4 CM/S
RIGHT ICA/CCA SYS: 0.7
RIGHT SUBCLAVIAN DIAS: 0 CM/S
RIGHT SUBCLAVIAN SYS: 123.4 CM/S
RIGHT VERTEBRAL DIAS: 12.75 CM/S
RIGHT VERTEBRAL SYS: 51.2 CM/S

## 2020-01-29 PROCEDURE — 93306 TTE W/DOPPLER COMPLETE: CPT

## 2020-01-29 PROCEDURE — 93880 EXTRACRANIAL BILAT STUDY: CPT

## 2020-08-06 ENCOUNTER — OFFICE VISIT (OUTPATIENT)
Dept: PULMONOLOGY | Age: 70
End: 2020-08-06

## 2020-08-06 VITALS
TEMPERATURE: 97.4 F | HEART RATE: 86 BPM | OXYGEN SATURATION: 97 % | WEIGHT: 279 LBS | DIASTOLIC BLOOD PRESSURE: 84 MMHG | BODY MASS INDEX: 36.98 KG/M2 | SYSTOLIC BLOOD PRESSURE: 147 MMHG | HEIGHT: 73 IN | RESPIRATION RATE: 20 BRPM

## 2020-08-06 DIAGNOSIS — G47.33 OSA (OBSTRUCTIVE SLEEP APNEA): Primary | ICD-10-CM

## 2020-08-06 DIAGNOSIS — I10 ESSENTIAL HYPERTENSION: ICD-10-CM

## 2020-08-06 DIAGNOSIS — E66.9 OBESITY (BMI 30-39.9): ICD-10-CM

## 2020-08-06 DIAGNOSIS — R06.3 CHEYNE-STOKES BREATHING: ICD-10-CM

## 2020-08-06 RX ORDER — PRAVASTATIN SODIUM 20 MG/1
TABLET ORAL
COMMUNITY
Start: 2020-05-30

## 2020-08-06 NOTE — LETTER
8/9/20 Patient: Maycol Mancilla YOB: 1950 Date of Visit: 8/6/2020 Jomar Wisdom, [address] [address] VIA Facsimile: [phone] Dear Jomar iWsdom MD, Thank you for referring Mr. Daly Bullock to [address] [place] [address] for evaluation. My notes for this consultation are attached. If you have questions, please do not hesitate to call me. I look forward to following your patient along with you.  
 
 
Sincerely, 
 
Santosh Abel, DO

## 2020-08-06 NOTE — PROGRESS NOTES
[facility]  Pulmonary, Critical Care, and Sleep Medicine      Sleep Office Follow up    Name: Gabriela Enciso     : 1950     Date: 2020         Reason For Visit: Follow Up Severe TAWANNA and APAP Therapy      Assessment:  1. Obstructive Sleep Apnea (TAWANNA): Severe (AHI 30; )- Clinically receiving benefit,and he is happy with PAP therapy. He still has very minimal  noted on compliance report. Cardiac Echo with preserved EF. I suspect this may be artifactual and due to patient requiring near max APAP settings. Will continue to monitor for now. AHI and [email or web address] slightly increased on most recent compliance report. If AHI continues to climb may need to switch to BIPAP  2. Hypertension- Norvasc  3. Obesity: Body mass index is 36.81 kg/m². 4. 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 Continue with APAP at 10/20 cwp  2. I have encouraged patient to try and loose some weight and to try and sleep in a semi-up right position so see if this reduces pressure requirement. 3. Monitor for any increase in  on compliance report  4. COVID-19 precautions discussed to include: wearing mask in public, handwashing, social distancing and sleeping in a separate bedroom when using PAP therapy. 5. Renew CPAP supplies  6. Healthy sleep habits and weight encouraged   7.  safety discussed  8. Follow up with Primary Care Provider (PCP) as directed and for routine health care maintenance. 9. Follow up in 6 months or sooner  pending above testing and clinical course             HPI:   The patient presents today for routine follow up He states the following:        TAWANNA/Sleep:    · He continues to report clinical benefit. · Reports good sleep qualtiy  · No mask difficulties  · No skin breakdown. · No aerophagia or bloating. · No orthopnea, gasping at night.   · No chest pain, no jaw pain  · No edema, dizziness, syncope or presyncope  · Still with minimal report of  on compliance report: Echo report unremarkable  · Reports continues proper PAP hygiene care  · He has lost some weight    · The patient reports practicing good hand hygiene, social/physical distancing and wearing a mask/face covering when going out in public. ·     Other:  · Does report some neck discomfort which he attributes to a report of C-spine fusion and resection of cervical nerve sheath tumor procedure at [facility] ([address]). The patient brought copies of this procedure which we will scan into the EMR      Insomnia:  · Currently not an issue  · His sleeping has improved and has previously stopped taking sleep aids.     Prior Sleep Agents  - Sonata- no benefit  - RemFresh- works but groggy if does not dedicate at least 7 hours for sleep    Pulmonary Nodule:  - No findings of pulmonary nodule on CT Chest ordered at last visit      [place]:  Initial 13/24    [place] Scale 8/6/2020 1/17/2020 11/29/2019 8/7/2019 6/7/2018 3/8/2018 7/25/2017   Sitting and Reading 2 2 1 1 1 1 0   Watching TV 0 2 1 2 2 1 1   Sitting, inactive in a public place (e.g. a movie theater or meeting) 0 0 0 1 1 1 0   As a passenger in a car for an hour, without a break 3 2 1 1 2 2 1   Lying down to rest in the afternoon, when circumstances permit 3 3 2 3 2 2 1   Sitting and talking to someone 0 0 0 1 0 0 0   Sitting quietly after lunch without alcohol 1 2 2 1 2 1 0   In a car, while stopped for a few minutes in traffic 0 0 0 1 0 0 0   [place] Sleepiness Score 9 11 7 11 10 8 3     3 most recent PHQ Screens 8/6/2020   PHQ Not Done -   Little interest or pleasure in doing things Not at all   Feeling down, depressed, irritable, or hopeless Not at all   Total Score PHQ 2 0     PAP Compliance  Report & Summary Trends  DME: [facility]    Date >4 hr use % time (Total % Use) AHI PAP Rx Pressure @ 95% Median Use Time Leak-Median  (95%) Notes   4/26-7/24/17 18 (64) 4.2 APAP 5/20 14.2 3:36 56.9    12/8/17-3/7/2018 49 (86) 4.4 APAP 5/20 13.2 4:14 73.3    3/9/18-6/6/18 77(89) 3.7 APAP 5/20 15.6 5:01 9.1    05/09/19-08/06/19 84 (100) 6.0 APAP 5/20 16.5 04:44 9.3 (16.5) : 0%- None   08/31/19-11/28/19 84 (97) 5.3 APAP 10/20 18.4 05:00 11.7 (20.1) : 2%- 5 min   10/19/19- 01/16/20 78 (97) 5.0 APAP 10/20 18.4 04:54 12.6 (21.0) : 1%- 3 min   5/8/20-8/5/20 79 (97) 8.2 APAP 10/20 19.4 04:46 15.0 (19.4) : 2%- 5 min                       Current Outpatient Medications   Medication Sig Dispense Refill    pravastatin (PRAVACHOL) 20 mg tablet       amLODIPine (NORVASC) 5 mg tablet Take 10 mg by mouth daily.  ibuprofen (MOTRIN) 400 mg tablet Take 1 Tab by mouth every eight (8) hours as needed.  cpap machine kit by Does Not Apply route. Overnight Auto CPAP at 5-20 CWP with ramp and humidifier. Mask: Nasal mask. Supplies 99 month. Please send compliance data [id number]. Diagnosis TAWANNA. AHI 30 RDI 30. [facility] order adjustment 1 Kit 0    naproxen (NAPROSYN) 500 mg tablet Take 500 mg by mouth two (2) times daily (with meals).  aspirin delayed-release 81 mg tablet Take 81 mg by mouth daily.  acetaminophen (TYLENOL) 650 mg CR tablet Take 650 mg by mouth every six (6) hours as needed for Pain.  tiZANidine (ZANAFLEX) 4 mg tablet Take 4 mg by mouth every six (6) hours as needed.            Review of Systems:  HEENT: No epistaxis, no nasal drainage, no difficulty in swallowing, no redness in eyes  Respiratory: No cough sob or wheezing  Sleep: As above  Cardiovascular: no chest pain, no palpitations, no chronic leg edema, no syncope  Gastrointestinal: no abdominal pain, some bloating, no vomiting, no diarrhea, no bleeding symptoms  Genitourinary: No urinary symptoms or hematuria  Integument/breast: No ulcers or rashes  Musculoskeletal:Neg  Neurological: No focal weakness, no seizures, no headaches  Behvioral/Psych: No anxiety, no depression  Constitutional: No fever, no chills, no weight loss, no night sweats     Objective:     Visit Vitals  /84 (BP 1 Location: Right arm, BP Patient Position: Sitting)   Pulse 86   Temp 97.4 °F (36.3 °C) (Oral)   Resp 20   Ht 6' 1\" (1.854 m)   Wt 126.6 kg (279 lb)   SpO2 97%   BMI 36.81 kg/m²         Physical Exam:   General:  no acute distress, calm, comfortable, + Obese body habitus  HEENT: pupils reactive, sclera anicteric, EOM intact, Malampati score 3,   Tongue: + Macroglossia, midline  Neck: Non tender, no adenopathy or thyroid swelling,  No JVD, right carotid with brisk pulsations - this is also near prior surgical site for C-spine surgery. No Bruit heard  CVS: Regular rate add rythmn,  no murmurs  RS: Moderate air movement bilaterally, no accessory muscle use, clear-  No wheezing  Abd: Obeese, soft, non tender   Ext:  No clubbing, edema or cyanosis  Neuro: non focal, awake, alert, normal muscle tone  Extrm: no leg edema, clubbing or cyanosis  Skin: no rash    Data review:     CT Chest:  CT Results (most recent):  Results from Hospital Encounter encounter on 08/15/19   CT CHEST W CONT    Narrative EXAM: CT chest     INDICATION: Follow-up lung scarring. COMPARISON: Outside hospital ct chest 1/27/2016    TECHNIQUE: Axial CT imaging from the thoracic inlet through the diaphragm with  intravenous contrast. Multiplanar reformats were generated. One or more dose  reduction techniques were used on this CT: automated exposure control,  adjustment of the mAs and/or kVp according to patient size, and iterative  reconstruction techniques. The specific techniques used on this CT exam have  been documented in the patient's electronic medical record. Digital Imaging and  Communications in Medicine (DICOM) format image data are available to  nonaffiliated external healthcare facilities or entities on a secured, media  free, reciprocally searchable basis with patient authorization for at least a  12-month period after this study.     _______________    FINDINGS:    BASE OF THE NECK: Normal.    LUNGS: Scarring/atelectasis is again noted in both lower lungs. A subpleural 0.4  cm nodule is present in the lateral aspect of the left lower lung, unchanged in  appearance since prior examination. There is no new pulmonary nodule. No focal  consolidation. AIRWAY: Normal.    PLEURA: Normal.    MEDIASTINUM: Normal heart size. No pericardial effusion. Vasculature is  unremarkable. LYMPH NODES: No enlarged lymph nodes. UPPER ABDOMEN: Scattered hepatic cysts are present. Scattered bilateral renal  cysts are present. BONES: No acute or aggressive osseous abnormalities identified. Status post  median sternotomy. Postsurgical changes are present within the upper thoracic  spine. OTHER: None.    _______________      Impression IMPRESSION:    1. Stable scarring/atelectasis in both lower lungs. No suspicious pulmonary  nodule. No focal consolidation. No acute process in the chest.         Prior Sleep Testing  1/21/2015  INDICATION: ESS 13     CONCLUSION     severe obstructive sleep apnea syndrome with AHI of 30, and  respiratory disturbance index (RDI) of 30. A very successful CPAP titration with elimination of obstructive  sleep apnea and associated hypoxemia. At 19 CPAP AHI reduced to  0, RDI reduced to 0 REM supine sleep seen, REM seen. Snore index  4, Arousal index 8    Lowest oxygen saturation 74%. At optimal pressure oxygen  saturation 93-95%. Positional and REM association to respiratory events. No significant LM related arousals seen. 1 LM arousal index  improved to 0 on therapy. At 12 CPAP AHI reduced to 0, RDI reduced to 0 REM supine sleep  seen, REM seen. Snore Index 404, Arousal Index 3. Sleep efficiency during diagnostic portion 91 with 144 min of  sleep and treatment portion 52 with 187 min sleep    Average heart rate 74-84, Arrythmia none occasional PVCs seen  also elevated BP noted during surgery.       RECOMMENDATIONS     Overnight CPAP at 19 CWP with ramp and humidifier. Mask: Quattro Mirage Large or mask of choice. If issues with pressure tolerance CPAP 12 is consider optimal.    If continue to have symptoms and issues with AHI as other CPAP  13,14 were good but noted hypopnea at higher pressure consider  Auto PAP 5-20 or re titration.                Lee Ann Villanueva DO, [facility]  Pulmonary, Sleep, Critical Care Medicine

## 2020-08-06 NOTE — PROGRESS NOTES
Sandra Sandhu presents today for   Chief Complaint   Patient presents with    Sleep Apnea     Carotid US 1/29.  CPAP       Is someone accompanying this pt? No    Is the patient using any DME equipment during OV? APAP    -[address] Depression Screening:  3 most recent PHQ Screens 8/6/2020   PHQ Not Done -   Little interest or pleasure in doing things Not at all   Feeling down, depressed, irritable, or hopeless Not at all   Total Score PHQ 2 0       Learning Assessment:  Learning Assessment 7/25/2017   PRIMARY LEARNER Patient   HIGHEST LEVEL OF EDUCATION - PRIMARY LEARNER  > 4 YEARS OF COLLEGE   PRIMARY LANGUAGE ENGLISH   LEARNER PREFERENCE PRIMARY READING     DEMONSTRATION   ANSWERED BY patient, Carolyne Crowley     bsps   RELATIONSHIP SELF       Abuse Screening:  Abuse Screening Questionnaire 8/7/2019   Do you ever feel afraid of your partner? N   Are you in a relationship with someone who physically or mentally threatens you? N   Is it safe for you to go home? Y       Fall Risk  Fall Risk Assessment, last 12 mths 8/6/2020   Able to walk? Yes   Fall in past 12 months? No         Coordination of Care:  1. Have you been to the ER, urgent care clinic since your last visit? Hospitalized since your last visit? No    2. Have you seen or consulted any other health care providers outside of the [address] [place] [address] since your last visit? Include any pap smears or colon screening.  No.

## 2020-08-06 NOTE — PROGRESS NOTES
[facility]  Pulmonary, Critical Care, and Sleep Medicine      Sleep Office Follow up    Name: Fernanda Alva     : 1950     Date: 2020         Reason For Visit: Follow Up Severe TAWANNA and APAP Therapy      Assessment:  1. Obstructive Sleep Apnea (TAWANNA): Severe (AHI 30; )- Clinically receiving benefit, previously report afternoon fatigue now resolved. 5 minutes of  noted on this most recent compliance report. Unclear if this is artifact or something else. ROS negative for any cardiac symptoms. He does note some right, posterior neck pain that appears related to prior report of C-spine fusion procedure. 2. Sleep maintenance insomnia- much improved- not taking any sleep agents at this time  3. Hypertension- Norvasc  4. Prominent carotid pulsations -Right: no bruit on auscultation: unclear if normal due to post surgical changes vs other process  5. Obesity: Body mass index is 36.81 kg/m². 6. 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 Continue with APAP at 10/20 cwp  2. Obtain Cardiac Echo to assess EF due to  noted on compliance report. No history of suggest CHF process  3. Obtain carotid US- due to brisk carotid pulsations noted on today's exam.  4. Renew CPAP supplies  5. Sleep hygiene discussed. 6.  safety discussed  7. Weight loss and healthy lifestyle changes encouraged  8. Follow up with Primary Care Provider (PCP) as directed and for routine health care maintenance. 9. Follow up in 6 months or sooner  pending above testing and clinical course             HPI:   The patient presents today for routine follow up He states the following:        TAWANNA/Sleep:    · He continues to report clinical benefit. · Reports good sleep qualtiy  · No mask difficulties  · No skin breakdown. · No aerophagia or bloating. · No orthopnea, gasping at night.   · No chest pain, no jaw pain  · No edema, dizziness, syncope or presyncope  · Sleep time normally around [id number]  · Still with minimal report of  on compliance report  · Reports continues proper PAP hygiene care  ·     Other:  · Does report some neck discomfort which he attributes to a report of C-spine fusion and resection of cervical nerve sheath tumor procedure at [facility] ([address]). The patient brought copies of this procedure which we will scan into the EMR      Insomnia:  · Currently not an issue  · His sleeping has improved and has previously stopped taking sleep aids.     Prior Sleep Agents  - Sonata- no benefit  - RemFresh- works but groggy if does not dedicate at least 7 hours for sleep    Pulmonary Nodule:  - No findings of pulmonary nodule on CT Chest ordered at last visit      [place]:  Initial 13/24    [place] Scale 8/6/2020 1/17/2020 11/29/2019 8/7/2019 6/7/2018 3/8/2018 7/25/2017   Sitting and Reading 2 2 1 1 1 1 0   Watching TV 0 2 1 2 2 1 1   Sitting, inactive in a public place (e.g. a movie theater or meeting) 0 0 0 1 1 1 0   As a passenger in a car for an hour, without a break 3 2 1 1 2 2 1   Lying down to rest in the afternoon, when circumstances permit 3 3 2 3 2 2 1   Sitting and talking to someone 0 0 0 1 0 0 0   Sitting quietly after lunch without alcohol 1 2 2 1 2 1 0   In a car, while stopped for a few minutes in traffic 0 0 0 1 0 0 0   [place] Sleepiness Score 9 11 7 11 10 8 3     3 most recent PHQ Screens 8/6/2020   PHQ Not Done -   Little interest or pleasure in doing things Not at all   Feeling down, depressed, irritable, or hopeless Not at all   Total Score PHQ 2 0     PAP Compliance  Report & Summary Trends  DME: [facility]    Date >4 hr use % time (Total % Use) AHI PAP Rx Pressure @ 95% Median Use Time Leak-Median  (95%) Notes   4/26-7/24/17 18 (64) 4.2 APAP 5/20 14.2 3:36 56.9    12/8/17-3/7/2018 49 (86) 4.4 APAP 5/20 13.2 4:14 73.3    3/9/18-6/6/18 77(89) 3.7 APAP 5/20 15.6 5:01 9.1    05/09/19-08/06/19 84 (100) 6.0 APAP 5/20 16.5 04:44 9.3 (16.5) : 0%- None   08/31/19-11/28/19 84 (97) 5.3 APAP 10/20 18.4 05:00 11.7 (20.1) : 2%- 5 min   10/19/19- 01/16/20 78 (97) 5.0 APAP 10/20 18.4 04:54 12.6 (21.0) : 1%- 3 min   5/8/20-8/5/20 79 (97) 8.2 APAP 10/20 19.4                          Current Outpatient Medications   Medication Sig Dispense Refill    pravastatin (PRAVACHOL) 20 mg tablet       amLODIPine (NORVASC) 5 mg tablet Take 10 mg by mouth daily.  ibuprofen (MOTRIN) 400 mg tablet Take 1 Tab by mouth every eight (8) hours as needed.  cpap machine kit by Does Not Apply route. Overnight Auto CPAP at 5-20 CWP with ramp and humidifier. Mask: Nasal mask. Supplies 99 month. Please send compliance data [id number]. Diagnosis TAWANNA. AHI 30 RDI 30. [facility] order adjustment 1 Kit 0    naproxen (NAPROSYN) 500 mg tablet Take 500 mg by mouth two (2) times daily (with meals).  aspirin delayed-release 81 mg tablet Take 81 mg by mouth daily.  acetaminophen (TYLENOL) 650 mg CR tablet Take 650 mg by mouth every six (6) hours as needed for Pain.  tiZANidine (ZANAFLEX) 4 mg tablet Take 4 mg by mouth every six (6) hours as needed.            Review of Systems:  HEENT: No epistaxis, no nasal drainage, no difficulty in swallowing, no redness in eyes  Respiratory: No cough sob or wheezing  Sleep: As above  Cardiovascular: no chest pain, no palpitations, no chronic leg edema, no syncope  Gastrointestinal: no abdominal pain, some bloating, no vomiting, no diarrhea, no bleeding symptoms  Genitourinary: No urinary symptoms or hematuria  Integument/breast: No ulcers or rashes  Musculoskeletal:Neg  Neurological: No focal weakness, no seizures, no headaches  Behvioral/Psych: No anxiety, no depression  Constitutional: No fever, no chills, no weight loss, no night sweats     Objective:     Visit Vitals  /84 (BP 1 Location: Right arm, BP Patient Position: Sitting)   Pulse 86   Temp 97.4 °F (36.3 °C) (Oral)   Resp 20 Ht 6' 1\" (1.854 m)   Wt 126.6 kg (279 lb)   SpO2 97%   BMI 36.81 kg/m²         Physical Exam:   General:  no acute distress, calm, comfortable, + Obese body habitus  HEENT: pupils reactive, sclera anicteric, EOM intact, Malampati score 3,   Tongue: + Macroglossia, midline  Neck: Non tender, no adenopathy or thyroid swelling,  No JVD, right carotid with brisk pulsations - this is also near prior surgical site for C-spine surgery. No Bruit heard  CVS: Regular rate add rythmn,  no murmurs  RS: Moderate air movement bilaterally, no accessory muscle use, clear-  No wheezing  Abd: Obeese, soft, non tender   Ext:  No clubbing, edema or cyanosis  Neuro: non focal, awake, alert, normal muscle tone  Extrm: no leg edema, clubbing or cyanosis  Skin: no rash    Data review:     CT Chest:  CT Results (most recent):  Results from Hospital Encounter encounter on 08/15/19   CT CHEST W CONT    Narrative EXAM: CT chest     INDICATION: Follow-up lung scarring. COMPARISON: Outside hospital ct chest 1/27/2016    TECHNIQUE: Axial CT imaging from the thoracic inlet through the diaphragm with  intravenous contrast. Multiplanar reformats were generated. One or more dose  reduction techniques were used on this CT: automated exposure control,  adjustment of the mAs and/or kVp according to patient size, and iterative  reconstruction techniques. The specific techniques used on this CT exam have  been documented in the patient's electronic medical record. Digital Imaging and  Communications in Medicine (DICOM) format image data are available to  nonaffiliated external healthcare facilities or entities on a secured, media  free, reciprocally searchable basis with patient authorization for at least a  12-month period after this study. _______________    FINDINGS:    BASE OF THE NECK: Normal.    LUNGS: Scarring/atelectasis is again noted in both lower lungs.  A subpleural 0.4  cm nodule is present in the lateral aspect of the left lower lung, unchanged in  appearance since prior examination. There is no new pulmonary nodule. No focal  consolidation. AIRWAY: Normal.    PLEURA: Normal.    MEDIASTINUM: Normal heart size. No pericardial effusion. Vasculature is  unremarkable. LYMPH NODES: No enlarged lymph nodes. UPPER ABDOMEN: Scattered hepatic cysts are present. Scattered bilateral renal  cysts are present. BONES: No acute or aggressive osseous abnormalities identified. Status post  median sternotomy. Postsurgical changes are present within the upper thoracic  spine. OTHER: None.    _______________      Impression IMPRESSION:    1. Stable scarring/atelectasis in both lower lungs. No suspicious pulmonary  nodule. No focal consolidation. No acute process in the chest.         Prior Sleep Testing  1/21/2015  INDICATION: ESS 13     CONCLUSION     severe obstructive sleep apnea syndrome with AHI of 30, and  respiratory disturbance index (RDI) of 30. A very successful CPAP titration with elimination of obstructive  sleep apnea and associated hypoxemia. At 19 CPAP AHI reduced to  0, RDI reduced to 0 REM supine sleep seen, REM seen. Snore index  4, Arousal index 8    Lowest oxygen saturation 74%. At optimal pressure oxygen  saturation 93-95%. Positional and REM association to respiratory events. No significant LM related arousals seen. 1 LM arousal index  improved to 0 on therapy. At 12 CPAP AHI reduced to 0, RDI reduced to 0 REM supine sleep  seen, REM seen. Snore Index 404, Arousal Index 3. Sleep efficiency during diagnostic portion 91 with 144 min of  sleep and treatment portion 52 with 187 min sleep    Average heart rate 74-84, Arrythmia none occasional PVCs seen  also elevated BP noted during surgery. RECOMMENDATIONS     Overnight CPAP at 19 CWP with ramp and humidifier. Mask: Quattro Mirage Large or mask of choice.     If issues with pressure tolerance CPAP 12 is consider optimal.    If continue to have symptoms and issues with AHI as other CPAP  13,14 were good but noted hypopnea at higher pressure consider  Auto PAP 5-20 or re titration.                Burnis Conklin, DO, FCCP  Pulmonary, Sleep, Critical Care Medicine

## 2021-03-19 ENCOUNTER — DOCUMENTATION ONLY (OUTPATIENT)
Dept: PULMONOLOGY | Age: 71
End: 2021-03-19

## 2021-03-30 ENCOUNTER — OFFICE VISIT (OUTPATIENT)
Dept: PULMONOLOGY | Age: 71
End: 2021-03-30
Payer: MEDICARE

## 2021-03-30 VITALS
SYSTOLIC BLOOD PRESSURE: 134 MMHG | WEIGHT: 275.8 LBS | BODY MASS INDEX: 36.55 KG/M2 | HEART RATE: 85 BPM | TEMPERATURE: 97.8 F | RESPIRATION RATE: 20 BRPM | DIASTOLIC BLOOD PRESSURE: 77 MMHG | OXYGEN SATURATION: 98 % | HEIGHT: 73 IN

## 2021-03-30 DIAGNOSIS — I10 HYPERTENSION, UNSPECIFIED TYPE: ICD-10-CM

## 2021-03-30 DIAGNOSIS — I10 ESSENTIAL HYPERTENSION: ICD-10-CM

## 2021-03-30 DIAGNOSIS — E66.9 OBESITY (BMI 30-39.9): ICD-10-CM

## 2021-03-30 DIAGNOSIS — G47.33 OSA (OBSTRUCTIVE SLEEP APNEA): Primary | ICD-10-CM

## 2021-03-30 DIAGNOSIS — R06.3 CHEYNE-STOKES BREATHING: ICD-10-CM

## 2021-03-30 PROCEDURE — G8754 DIAS BP LESS 90: HCPCS | Performed by: INTERNAL MEDICINE

## 2021-03-30 PROCEDURE — G8427 DOCREV CUR MEDS BY ELIG CLIN: HCPCS | Performed by: INTERNAL MEDICINE

## 2021-03-30 PROCEDURE — 99213 OFFICE O/P EST LOW 20 MIN: CPT | Performed by: INTERNAL MEDICINE

## 2021-03-30 PROCEDURE — 1101F PT FALLS ASSESS-DOCD LE1/YR: CPT | Performed by: INTERNAL MEDICINE

## 2021-03-30 PROCEDURE — G8536 NO DOC ELDER MAL SCRN: HCPCS | Performed by: INTERNAL MEDICINE

## 2021-03-30 PROCEDURE — 3017F COLORECTAL CA SCREEN DOC REV: CPT | Performed by: INTERNAL MEDICINE

## 2021-03-30 PROCEDURE — G8510 SCR DEP NEG, NO PLAN REQD: HCPCS | Performed by: INTERNAL MEDICINE

## 2021-03-30 PROCEDURE — G8752 SYS BP LESS 140: HCPCS | Performed by: INTERNAL MEDICINE

## 2021-03-30 PROCEDURE — G8417 CALC BMI ABV UP PARAM F/U: HCPCS | Performed by: INTERNAL MEDICINE

## 2021-03-30 NOTE — PROGRESS NOTES
Theron Ordonez presents today for   Chief Complaint   Patient presents with   • Sleep Apnea     Pt states he has been doing well likes the fit of his mask.        Is someone accompanying this pt? No    Is the patient using any DME equipment during OV? CPAP    -DME [employer].    Depression Screening:  3 most recent PHQ Screens 3/30/2021   PHQ Not Done -   Little interest or pleasure in doing things Not at all   Feeling down, depressed, irritable, or hopeless Not at all   Total Score PHQ 2 0       Learning Assessment:  Learning Assessment 7/25/2017   PRIMARY LEARNER Patient   HIGHEST LEVEL OF EDUCATION - PRIMARY LEARNER  > 4 YEARS OF COLLEGE   PRIMARY LANGUAGE ENGLISH   LEARNER PREFERENCE PRIMARY READING     DEMONSTRATION   ANSWERED BY patient, Theron Ordonez     bsps   RELATIONSHIP SELF       Abuse Screening:  Abuse Screening Questionnaire 8/7/2019   Do you ever feel afraid of your partner? N   Are you in a relationship with someone who physically or mentally threatens you? N   Is it safe for you to go home? Y       Fall Risk  Fall Risk Assessment, last 12 mths 3/30/2021   Able to walk? Yes   Fall in past 12 months? 0   Do you feel unsteady? 0   Are you worried about falling 0         Coordination of Care:  1. Have you been to the ER, urgent care clinic since your last visit? Hospitalized since your last visit? No    2. Have you seen or consulted any other health care providers outside of the [facility] since your last visit? Include any pap smears or colon screening. [facility].  Pt doesnot remember name of  The doctor he went to see.

## 2021-03-30 NOTE — LETTER
3/31/2021 Patient: Stephanie Araiza YOB: 1950 Date of Visit: 3/30/2021 Sho Ramirez, [address] [address] Via Fax: [phone] Dear Sho Ramirez MD, Thank you for referring Mr. Kymberly Jaimes to [address] [place] [address] for evaluation. My notes for this consultation are attached. If you have questions, please do not hesitate to call me. I look forward to following your patient along with you.  
 
 
Sincerely, 
 
Aj Caballero, DO

## 2021-03-30 NOTE — PATIENT INSTRUCTIONS
Please call our clinic back at [phone] if you have not received a follow up appointment within 30 days prior the recommended follow up time.

## 2022-03-19 PROBLEM — G47.33 OSA (OBSTRUCTIVE SLEEP APNEA): Status: ACTIVE | Noted: 2018-03-08

## 2022-03-19 PROBLEM — E66.9 OBESITY (BMI 30-39.9): Status: ACTIVE | Noted: 2018-03-08

## 2022-03-28 ENCOUNTER — OFFICE VISIT (OUTPATIENT)
Dept: PULMONOLOGY | Age: 72
End: 2022-03-28
Payer: MEDICARE

## 2022-03-28 VITALS
RESPIRATION RATE: 16 BRPM | WEIGHT: 271 LBS | BODY MASS INDEX: 35.92 KG/M2 | OXYGEN SATURATION: 97 % | HEART RATE: 91 BPM | HEIGHT: 73 IN | SYSTOLIC BLOOD PRESSURE: 137 MMHG | TEMPERATURE: 97.8 F | DIASTOLIC BLOOD PRESSURE: 77 MMHG

## 2022-03-28 DIAGNOSIS — I10 ESSENTIAL HYPERTENSION: ICD-10-CM

## 2022-03-28 DIAGNOSIS — G47.33 OSA (OBSTRUCTIVE SLEEP APNEA): Primary | ICD-10-CM

## 2022-03-28 DIAGNOSIS — R06.3 CHEYNE-STOKES BREATHING: ICD-10-CM

## 2022-03-28 DIAGNOSIS — E66.9 OBESITY (BMI 30-39.9): ICD-10-CM

## 2022-03-28 PROCEDURE — 3017F COLORECTAL CA SCREEN DOC REV: CPT | Performed by: INTERNAL MEDICINE

## 2022-03-28 PROCEDURE — G8510 SCR DEP NEG, NO PLAN REQD: HCPCS | Performed by: INTERNAL MEDICINE

## 2022-03-28 PROCEDURE — G8754 DIAS BP LESS 90: HCPCS | Performed by: INTERNAL MEDICINE

## 2022-03-28 PROCEDURE — 1101F PT FALLS ASSESS-DOCD LE1/YR: CPT | Performed by: INTERNAL MEDICINE

## 2022-03-28 PROCEDURE — G8752 SYS BP LESS 140: HCPCS | Performed by: INTERNAL MEDICINE

## 2022-03-28 PROCEDURE — G8536 NO DOC ELDER MAL SCRN: HCPCS | Performed by: INTERNAL MEDICINE

## 2022-03-28 PROCEDURE — 99214 OFFICE O/P EST MOD 30 MIN: CPT | Performed by: INTERNAL MEDICINE

## 2022-03-28 PROCEDURE — G8417 CALC BMI ABV UP PARAM F/U: HCPCS | Performed by: INTERNAL MEDICINE

## 2022-03-28 PROCEDURE — G8427 DOCREV CUR MEDS BY ELIG CLIN: HCPCS | Performed by: INTERNAL MEDICINE

## 2022-03-28 RX ORDER — HYDROCHLOROTHIAZIDE 25 MG/1
TABLET ORAL
COMMUNITY
Start: 2022-03-08

## 2022-03-28 NOTE — PATIENT INSTRUCTIONS
Please call our clinic back at [phone] or send a message on [employer] if you have any questions or concerns or if you are experiencing any of the following:     You and not received a follow up appointment within 30 days prior the recommended follow up time.  If you are not tolerating treatment plan   if you are experiencing any difficulties with the Durable Medical Equipment  (DME) Company you may be using or is assigned to you.  Two weeks have passed and you have not received an appointment for a scheduled procedure   Two weeks have passed since you underwent a test and/or procedure and you have not received your results. If you are using a CPAP/BIPAP, or Home Ventilator Device- Please note the following  [employer] company is supposed to provide you with replacement filters, tubing and masks. You can either call your DME when you need new supplies or you can arrange for an automatic shipment schedule.  Your need to be seen by our office at lat minimum of every 12 months in order to renew the prescription for these supplies.  Please make note of who your DME company is and their phone number.  Please make sure that you clean your mask and hosing on a regular basis.   Your DME can provide you with additional information regarding proper care and cleaning of your device    Thank you

## 2022-03-28 NOTE — PROGRESS NOTES
[facility]  Pulmonary, Critical Care, and Sleep Medicine      Sleep Office Follow up    Name: Rosey Frausto     : 1950     Date: 3/28/2022         Reason For Visit: Follow Up Severe TAWANNA and APAP Therapy      Assessment:  1. Obstructive Sleep Apnea (TAWANNA): Severe (AHI 30; )- Clinically receiving benefit,and he is happy with PAP therapy. Patient does note some increase in Excessive Daytime Sleepiness (EDS). AHI is also up. Patient is requiring maximal CPAP settings.  noted which could be artifact due to patient abutting max CPAP settings. No symptoms or findings to suggest CHF or neurological process. No opioid use. He does take Zanaflex PRN- typically 1-2 times weekly. 2. Hypertension-    3. Obesity: Body mass index is 35.75 kg/m². 4. 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 Continue with APAP change APAP  10 to 15/ - Patient is requiring max settings of CPAP. If AHI still high, then the patient may need to undergo in-lab PAP titration to ensure no centrals and that VPAP optimized his condition- Above discussed with patient  2. The patient's device is several years old, but does appear to be functioning properly at this time. 3. Renew PAP supplies  4. I have encouraged patient to try and loose some weight and to try and sleep in a semi-up right position so see if this reduces pressure requirement. 5. Monitor for any increase in  on compliance report  6. Healthy sleep habits and weight encouraged   7.  safety discussed  8. Follow up with Primary Care Provider (PCP) as directed and for routine health care maintenance. 9. Follow up in 3 months or sooner  pending above testing and clinical course. HPI:   The patient  Is a 73 y.o. male who presents today for routine follow up.      Today the following is noted and/or reported:(Last Clinic Visit: 3/30/21)      TAWANNA/Sleep:     · Patient notes return of EDS but not as bad as prior to PAP therapy  · He does take Zanaflex for back spasms- typically, he takes 1-2 times weekly  · He takes either Naprosyn or Motrin but not both at the same time  · Patient denies any drowsy driving  · Reports good sleep quality  · No dyspnea, legs swelling, orthopnea, kidney problems  · No mask difficulties  · No skin breakdown. · No aerophagia or bloating. · No orthopnea, gasping at night. · No chest pain, no jaw pain  · No edema, dizziness, syncope or presyncope  ·    · Reports continues proper PAP hygiene care  ·      Other:  · Does report some neck discomfort which he attributes to a report of C-spine fusion and resection of cervical nerve sheath tumor procedure at [facility] ([address]). Insomnia:  · Currently not an issue  · His sleeping has improved and has previously stopped taking sleep aids.     Prior Sleep Agents  - Sonata- no benefit  - RemFresh- works but groggy if does not dedicate at least 7 hours for sleep    Pulmonary Nodule:  - No findings of pulmonary nodule on CT Chest ordered at last visit      Ethan:  Initial 13/24    Ethan Scale 3/28/2022 3/30/2021 8/6/2020 1/17/2020 11/29/2019 8/7/2019 6/7/2018   Sitting and Reading 1 1 2 2 1 1 1   Watching TV 2 2 0 2 1 2 2   Sitting, inactive in a public place (e.g. a movie theater or meeting) 1 1 0 0 0 1 1   As a passenger in a car for an hour, without a break 3 2 3 2 1 1 2   Lying down to rest in the afternoon, when circumstances permit 3 3 3 3 2 3 2   Sitting and talking to someone 0 0 0 0 0 1 0   Sitting quietly after lunch without alcohol 2 2 1 2 2 1 2   In a car, while stopped for a few minutes in traffic 0 1 0 0 0 1 0   Ethan Sleepiness Score 12 12 9 11 7 11 10     3 most recent PHQ Screens 3/28/2022   PHQ Not Done -   Little interest or pleasure in doing things Not at all   Feeling down, depressed, irritable, or hopeless Not at all   Total Score PHQ 2 0     PAP Compliance Report & Summary Trends  DME: [address]  Device: Arn[employer] Edge  Date >4 hr use % time (Total % Use) AHI PAP Rx Pressure @ 95% Median Use Time Leak-Median  (95%) Notes   4/26-7/24/17 18 (64) 4.2 APAP 5/20 14.2 3:36 56.9    12/8/17-3/7/2018 49 (86) 4.4 APAP 5/20 13.2 4:14 73.3    3/9/18-6/6/18 77(89) 3.7 APAP 5/20 15.6 5:01 9.1    05/09/19-08/06/19 84 (100) 6.0 APAP 5/20 16.5 04:44 9.3 (16.5) : 0%- None   08/31/19-11/28/19 84 (97) 5.3 APAP 10/20 18.4 05:00 11.7 (20.1) : 2%- 5 min   10/19/19- 01/16/20 78 (97) 5.0 APAP 10/20 18.4 04:54 12.6 (21.0) : 1%- 3 min   5/8/20-8/5/20 79 (97) 8.2 APAP 10/20 19.4 04:46 15.0 (19.4) : 2%- 5 min   12/30/20-03/29/21 79 (100)        7.2 APAP 10/20 18.8 05:00:00 10.7 (18.6) : 2%- 5 min   12/25/21-3/24/22 88 (99) 9.5 APAP 10/20 19.3 04: 58:00 7.4 (14.9) : 6%- 16 min                         Immunization History   Administered Date(s) Administered    Influenza Vaccine 10/01/2020    Influenza Vaccine (Tri) Adjuvanted (>65 Yrs FLUAD TRI 88894) 12/03/2019     Past Medical History:   Diagnosis Date    Chronic pain     [facility]     Hyperlipidemia     Hypertension     Pulmonary embolism ([facility]     jan 2016    Sepsis ([facility] 2016     Past Surgical History:   Procedure Laterality Date    HX TUMOR REMOVAL Right     nerve tumor    HX TURP  01/2016       Current Outpatient Medications   Medication Sig Dispense Refill    hydroCHLOROthiazide (HYDRODIURIL) 25 mg tablet       pravastatin (PRAVACHOL) 20 mg tablet       amLODIPine (NORVASC) 5 mg tablet Take 10 mg by mouth daily.  ibuprofen (MOTRIN) 400 mg tablet Take 1 Tab by mouth every eight (8) hours as needed.  cpap machine kit by Does Not Apply route. Overnight Auto CPAP at 5-20 CWP with ramp and humidifier. Mask: Nasal mask. Supplies 99 month. Please send compliance data [id number]. Diagnosis TAWANNA. AHI 30 RDI 30.  [facility] order adjustment 1 Kit 0    naproxen (NAPROSYN) 500 mg tablet Take 500 mg by mouth two (2) times daily (with meals).  tiZANidine (ZANAFLEX) 4 mg tablet Take 4 mg by mouth every six (6) hours as needed.  acetaminophen (TYLENOL) 650 mg CR tablet Take 650 mg by mouth every six (6) hours as needed for Pain.  aspirin delayed-release 81 mg tablet Take 81 mg by mouth daily. (Patient not taking: Reported on 3/28/2022)           Review of Systems:  HEENT: No epistaxis, no nasal drainage, no difficulty in swallowing, no redness in eyes  Respiratory: No cough sob or wheezing  Sleep: As above  Cardiovascular: no chest pain, no palpitations, no chronic leg edema, no syncope  Gastrointestinal: no abdominal pain, some bloating, no vomiting, no diarrhea, no bleeding symptoms  Genitourinary: No urinary symptoms or hematuria  Integument/breast: No ulcers or rashes  Musculoskeletal:Neg  Neurological: No focal weakness, no seizures, no headaches  Behvioral/Psych: No anxiety, no depression  Constitutional: No fever, no chills, no weight loss, no night sweats     Objective:     Visit Vitals  /77 (BP 1 Location: Right upper arm, BP Patient Position: Sitting, BP Cuff Size: Adult long)   Pulse 91   Temp 97.8 °F (36.6 °C) (Temporal)   Resp 16   Ht 6' 1\" (1.854 m)   Wt 122.9 kg (271 lb)   SpO2 97% Comment: Ra Rest   BMI 35.75 kg/m²         Physical Exam:   General:  no acute distress, calm, comfortable, + Obese body habitus  HEENT: pupils reactive, sclera anicteric, EOM intact, Malampati score 3, uvula- mildine  Tongue: + Macroglossia, midline  Neck: Non tender, no adenopathy or thyroid swelling,  No JVD, right carotid with brisk pulsations - this is also near prior surgical site for C-spine surgery.  No Bruit heard  CVS: Regular rate and rythmn,  no murmurs  RS: Moderate air movement bilaterally, no accessory muscle use, clear-  No wheezing  Abd: Obeese, soft, non tender   Ext:  No clubbing, edema or cyanosis  Neuro: non focal, awake, alert, normal muscle tone  Extrm: no leg edema, clubbing or cyanosis  Skin: no rash    Data review:     Echo:    01/29/20    ECHO ADULT COMPLETE 01/29/2020 1/29/2020    Interpretation Summary  · Normal cavity size, wall thickness and systolic function (ejection fraction normal). Calculated left ventricular ejection fraction is 61%. Biplane method used to measure ejection fraction. Mild (grade 1) left ventricular diastolic dysfunction E:E' RATIO = 9.30. · Trace mitral valve regurgitation is present. · Tricuspid regurgitation is inadequate for estimation of right ventricular systolic pressure. · Mild pulmonic valve regurgitation is present. Signed by: Frankey Combe, MD on 1/29/2020  3:38 PM    Doppler Carotid: 1/29/2020   Bilateral less than 50% stenosis of the internal carotid arteries. No significant stenosis in the external carotid arteries bilaterally. Antegrade flow in both vertebral arteries. Normal flow in both subclavian arteries. CT Chest:  CT Results (most recent):  Results from Hospital Encounter encounter on 08/15/19    CT CHEST W CONT    Narrative  EXAM: CT chest    INDICATION: Follow-up lung scarring. COMPARISON: Outside hospital ct chest 1/27/2016    TECHNIQUE: Axial CT imaging from the thoracic inlet through the diaphragm with  intravenous contrast. Multiplanar reformats were generated. One or more dose  reduction techniques were used on this CT: automated exposure control,  adjustment of the mAs and/or kVp according to patient size, and iterative  reconstruction techniques. The specific techniques used on this CT exam have  been documented in the patient's electronic medical record. Digital Imaging and  Communications in Medicine (DICOM) format image data are available to  nonaffiliated external healthcare facilities or entities on a secured, media  free, reciprocally searchable basis with patient authorization for at least a  12-month period after this study.     _______________    FINDINGS:    BASE OF THE NECK: Normal.    LUNGS: Scarring/atelectasis is again noted in both lower lungs. A subpleural 0.4  cm nodule is present in the lateral aspect of the left lower lung, unchanged in  appearance since prior examination. There is no new pulmonary nodule. No focal  consolidation. AIRWAY: Normal.    PLEURA: Normal.    MEDIASTINUM: Normal heart size. No pericardial effusion. Vasculature is  unremarkable. LYMPH NODES: No enlarged lymph nodes. UPPER ABDOMEN: Scattered hepatic cysts are present. Scattered bilateral renal  cysts are present. BONES: No acute or aggressive osseous abnormalities identified. Status post  median sternotomy. Postsurgical changes are present within the upper thoracic  spine. OTHER: None.    _______________    Impression  IMPRESSION:    1. Stable scarring/atelectasis in both lower lungs. No suspicious pulmonary  nodule. No focal consolidation. No acute process in the chest.        Prior Sleep Testing  1/21/2015  INDICATION: ESS 13     CONCLUSION     severe obstructive sleep apnea syndrome with AHI of 30, and  respiratory disturbance index (RDI) of 30. A very successful CPAP titration with elimination of obstructive  sleep apnea and associated hypoxemia. At 19 CPAP AHI reduced to  0, RDI reduced to 0 REM supine sleep seen, REM seen. Snore index  4, Arousal index 8    Lowest oxygen saturation 74%. At optimal pressure oxygen  saturation 93-95%. Positional and REM association to respiratory events. No significant LM related arousals seen. 1 LM arousal index  improved to 0 on therapy. At 12 CPAP AHI reduced to 0, RDI reduced to 0 REM supine sleep  seen, REM seen. Snore Index 404, Arousal Index 3. Sleep efficiency during diagnostic portion 91 with 144 min of  sleep and treatment portion 52 with 187 min sleep    Average heart rate 74-84, Arrythmia none occasional PVCs seen  also elevated BP noted during surgery. RECOMMENDATIONS     Overnight CPAP at 19 CWP with ramp and humidifier.      Mask: [employer] Large or mask of choice. If issues with pressure tolerance CPAP 12 is consider optimal.    If continue to have symptoms and issues with AHI as other CPAP  13,14 were good but noted hypopnea at higher pressure consider  Auto PAP 5-20 or re titration.                Malcolm Castillo DO, [facility]  Pulmonary, Sleep, Critical Care Medicine

## 2022-03-28 NOTE — PROGRESS NOTES
Tahmina Harry presents today for   Chief Complaint   Patient presents with    Follow-up    Sleep Apnea    CPAP       Is someone accompanying this pt? No    Is the patient using any DME equipment during OV? Yes - CPAP    -DME Company [employer]    Depression Screening:  3 most recent PHQ Screens 3/28/2022   PHQ Not Done -   Little interest or pleasure in doing things Not at all   Feeling down, depressed, irritable, or hopeless Not at all   Total Score PHQ 2 0       Crook Sleepiness Scale:  Crook Sleepiness Scale  3/28/2022   Sitting and Reading 1   Watching TV 2   Sitting, inactive in a public place (e.g. a movie theater or meeting) 1   As a passenger in a car for an hour, without a break 3   Lying down to rest in the afternoon, when circumstances permit 3   Sitting and talking to someone 0   Sitting quietly after lunch without alcohol 2   In a car, while stopped for a few minutes in traffic 0   Crook Sleepiness Score 12         Coordination of Care:  1. Have you been to the ER, urgent care clinic since your last visit? Hospitalized since your last visit? No    2. Have you seen or consulted any other health care providers outside of the [address] [place] [address] since your last visit? Include any pap smears or colon screening. Dr. Jack Davis     Medication list has been updated according to patient.

## 2022-03-28 NOTE — LETTER
3/28/2022    Patient: Terrence Boas   YOB: 1950   Date of Visit: 3/28/2022     Nasra Hernandez MD  [address] [facility]  [address] [place] [address]  Via Fax: [phone]    Dear Nasra Hernandez MD,      Thank you for referring Mr. Scott Kirk to [address] [place] [address] for evaluation. My notes for this consultation are attached. If you have questions, please do not hesitate to call me. I look forward to following your patient along with you.       Sincerely,    Gareth Emmanuel, DO

## 2022-06-29 ENCOUNTER — OFFICE VISIT (OUTPATIENT)
Dept: PULMONOLOGY | Age: 72
End: 2022-06-29
Payer: MEDICARE

## 2022-06-29 VITALS
RESPIRATION RATE: 16 BRPM | BODY MASS INDEX: 36.08 KG/M2 | TEMPERATURE: 97.4 F | DIASTOLIC BLOOD PRESSURE: 76 MMHG | HEIGHT: 73 IN | WEIGHT: 272.2 LBS | SYSTOLIC BLOOD PRESSURE: 132 MMHG | OXYGEN SATURATION: 97 % | HEART RATE: 63 BPM

## 2022-06-29 DIAGNOSIS — E66.9 OBESITY (BMI 30-39.9): ICD-10-CM

## 2022-06-29 DIAGNOSIS — G47.33 OSA (OBSTRUCTIVE SLEEP APNEA): Primary | ICD-10-CM

## 2022-06-29 DIAGNOSIS — I10 ESSENTIAL HYPERTENSION: ICD-10-CM

## 2022-06-29 DIAGNOSIS — R06.3 CHEYNE-STOKES BREATHING: ICD-10-CM

## 2022-06-29 PROCEDURE — G8754 DIAS BP LESS 90: HCPCS | Performed by: INTERNAL MEDICINE

## 2022-06-29 PROCEDURE — 99213 OFFICE O/P EST LOW 20 MIN: CPT | Performed by: INTERNAL MEDICINE

## 2022-06-29 PROCEDURE — G8536 NO DOC ELDER MAL SCRN: HCPCS | Performed by: INTERNAL MEDICINE

## 2022-06-29 PROCEDURE — G8752 SYS BP LESS 140: HCPCS | Performed by: INTERNAL MEDICINE

## 2022-06-29 PROCEDURE — 1123F ACP DISCUSS/DSCN MKR DOCD: CPT | Performed by: INTERNAL MEDICINE

## 2022-06-29 PROCEDURE — G8510 SCR DEP NEG, NO PLAN REQD: HCPCS | Performed by: INTERNAL MEDICINE

## 2022-06-29 PROCEDURE — 1101F PT FALLS ASSESS-DOCD LE1/YR: CPT | Performed by: INTERNAL MEDICINE

## 2022-06-29 PROCEDURE — G8417 CALC BMI ABV UP PARAM F/U: HCPCS | Performed by: INTERNAL MEDICINE

## 2022-06-29 PROCEDURE — 3017F COLORECTAL CA SCREEN DOC REV: CPT | Performed by: INTERNAL MEDICINE

## 2022-06-29 PROCEDURE — G8427 DOCREV CUR MEDS BY ELIG CLIN: HCPCS | Performed by: INTERNAL MEDICINE

## 2022-06-29 NOTE — PROGRESS NOTES
Doris Lang presents today for   Chief Complaint   Patient presents with    Sleep Apnea       Is someone accompanying this pt? No    Is the patient using any DME equipment during OV? No     -DME Company [employer] for CPAP     Depression Screening:  3 most recent PHQ Screens 3/28/2022   PHQ Not Done -   Little interest or pleasure in doing things Not at all   Feeling down, depressed, irritable, or hopeless Not at all   Total Score PHQ 2 0       Learning Assessment:  Learning Assessment 3/28/2022   PRIMARY LEARNER Patient   HIGHEST LEVEL OF EDUCATION - PRIMARY LEARNER  -   PRIMARY LANGUAGE ENGLISH   LEARNER PREFERENCE PRIMARY DEMONSTRATION     VIDEOS   ANSWERED BY Suzette lA     -   RELATIONSHIP SELF       Abuse Screening:  Abuse Screening Questionnaire 8/7/2019   Do you ever feel afraid of your partner? N   Are you in a relationship with someone who physically or mentally threatens you? N   Is it safe for you to go home? Y       Fall Risk  Fall Risk Assessment, last 12 mths 3/28/2022   Able to walk? Yes   Fall in past 12 months? 0   Do you feel unsteady? 0   Are you worried about falling 0         Coordination of Care:  1. Have you been to the ER, urgent care clinic since your last visit? Hospitalized since your last visit? No    2. Have you seen or consulted any other health care providers outside of the [address] [place] [address] since your last visit? Include any pap smears or colon screening.  No

## 2022-06-29 NOTE — PATIENT INSTRUCTIONS
Please call our clinic back at [phone] or send a message on [employer] if you have any questions or concerns or if you are experiencing any of the following:     You have not received a follow up appointment within 30 days prior the recommended follow up time.  If you are not tolerating treatment plan and/or not able to obtain equipment or prescribed medication(s).  if you are experiencing any difficulties with the Durable Medical Equipment  (DME) Company you may be using or is assigned to you.  Two weeks have passed and you have not received an appointment for a scheduled procedure.  Two weeks have passed since you underwent a test and/or procedure and you have not received your results. If you are using a CPAP/BIPAP, or Home Ventilator Device- Please note the following. Currently, many DMEs are experiencing supply chain difficulties and orders for equipment may be back logged several weeks to months. [employer] company is supposed to provide you with replacement filters, tubing and masks. You can either call your DME when you need new supplies or you can arrange for an automatic shipment schedule.  Your need to be seen by our office at lat minimum of every 12 months in order to renew the prescription for these supplies.  Please make note of who your DME company is and their phone number.  Please make sure that you clean your mask and hosing on a regular basis. Your DME can provide you with additional information regarding proper care and cleaning of your device           Safety is strongly encouraged. You should not drive if sleepy, tired, distracted and/or  fatigued. If a procedure or imaging study has been ordered for your by this clinic please call the [facility] at [facility] or Eh at  [address] [place] [address] [place] and blood work do not require appointments.   They are considered \"Walk-In\" services and can be obtained at either [facility] or [facility].     Blood work is performed at the Laboratory (Lab) from 08:00am - 04:00pm      -Thank you

## 2022-06-29 NOTE — PROGRESS NOTES
[facility]  Pulmonary, Critical Care, and Sleep Medicine      Sleep Office Follow up    Name: Donna Zelaya     : 1950     Date: 2022         Reason For Visit: Follow Up Severe TAWANNA and APAP Therapy      Assessment:  1. Obstructive Sleep Apnea (TAWANNA): Severe (AHI 30; )- Clinically receiving benefit,and he is happy with PAP therapy. Patient does note some increase in Excessive Daytime Sleepiness (EDS). AHI is also up. Patient is requiring maximal CPAP settings.  noted which could be artifact due to patient abutting max CPAP settings. No symptoms or findings to suggest CHF or neurological process. No opioid use. He does take Zanaflex PRN- typically 1-2 times weekly. 2. Hypertension-    3. Obesity: Body mass index is 35.91 kg/m². 4. 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 Continue with APAP change APAP  10/20 to  - Changes made remotely today in clinic to device. 2. Patient is requiring max settings of CPAP. If AHI still high, then the patient may need to undergo in-lab PAP titration to ensure no centrals and that VPAP optimized his condition- Above discussed with patient  3. The patient's device is several years old, but does appear to be functioning properly at this time. 4. Renew PAP supplies  5. I have encouraged patient to try and loose some weight and to try and sleep in a semi-up right position so see if this reduces pressure requirement. 6. Monitor for any increase in  on compliance report  7. Healthy sleep habits and weight encouraged   8.  safety discussed  9. Follow up with Primary Care Provider (PCP) as directed and for routine health care maintenance. 10. Follow up in 6 months or sooner  pending above testing and clinical course. HPI:   The patient  Is a 73 y.o. male who presents today for routine follow up.      Today the following is noted and/or reported:(Last Clinic Visit: 3/28/22)      TAWANNA/Sleep:     · Patient notes some return of EDS but not as bad as prior to PAP therapy. He reports that he still sleeps better when using his PAP device  · APAP pressure change order placed 3/28/22- not done  ·   · He does take Zanaflex for back spasms- typically, he takes 1-2 times weekly  · Patient again denies any drowsy driving  · Reports good sleep quality  · No dyspnea, legs swelling, orthopnea, kidney problems  · No significant weight changes  · No mask difficulties  · No skin breakdown. · No aerophagia or bloating. · No orthopnea, gasping at night. · No chest pain, no jaw pain  · No edema, dizziness, syncope or presyncope  · He prefers to sleep with a thin pillow and/or flat  ·    · Reports continues proper PAP hygiene care  ·      Other:  · Does report some neck discomfort which he attributes to a report of C-spine fusion and resection of cervical nerve sheath tumor procedure at [facility] ([address]). Insomnia:  · Currently not an issue  · His sleeping has improved and has previously stopped taking sleep aids.     Prior Sleep Agents  - Sonata- no benefit  - RemFresh- works but groggy if does not dedicate at least 7 hours for sleep    Pulmonary Nodule:  - No findings of pulmonary nodule on CT Chest ordered at last visit      Bath:  Initial 13/24    [place]ath Scale 6/29/2022 3/28/2022 3/30/2021 8/6/2020 1/17/2020 11/29/2019 8/7/2019   Sitting and Reading 2 1 1 2 2 1 1   Watching TV 2 2 2 0 2 1 2   Sitting, inactive in a public place (e.g. a movie theater or meeting) 1 1 1 0 0 0 1   As a passenger in a car for an hour, without a break 2 3 2 3 2 1 1   Lying down to rest in the afternoon, when circumstances permit 3 3 3 3 3 2 3   Sitting and talking to someone 0 0 0 0 0 0 1   Sitting quietly after lunch without alcohol 2 2 2 1 2 2 1   In a car, while stopped for a few minutes in traffic 0 0 1 0 0 0 1   [place] Sleepiness Score 12 12 12 9 11 7 11     3 most recent PHQ Screens 6/29/2022   PHQ Not Done -   Little interest or pleasure in doing things Not at all   Feeling down, depressed, irritable, or hopeless Not at all   Total Score PHQ 2 0     PAP Compliance  Report & Summary Trends  DME: [facility]  Device: Patti Samuel  Date >4 hr use % time (Total % Use) AHI PAP Rx Pressure @ 95% Median Use Time Leak-Median  (95%) Notes   4/26-7/24/17 18 (64) 4.2 APAP 5/20 14.2 3:36 56.9    12/8/17-3/7/2018 49 (86) 4.4 APAP 5/20 13.2 4:14 73.3    3/9/18-6/6/18 77(89) 3.7 APAP 5/20 15.6 5:01 9.1    05/09/19-08/06/19 84 (100) 6.0 APAP 5/20 16.5 04:44 9.3 (16.5) : 0%- None   08/31/19-11/28/19 84 (97) 5.3 APAP 10/20 18.4 05:00 11.7 (20.1) : 2%- 5 min   10/19/19- 01/16/20 78 (97) 5.0 APAP 10/20 18.4 04:54 12.6 (21.0) : 1%- 3 min   5/8/20-8/5/20 79 (97) 8.2 APAP 10/20 19.4 04:46 15.0 (19.4) : 2%- 5 min   12/30/20-03/29/21 79 (100)        7.2 APAP 10/20 18.8 05:00:00 10.7 (18.6) : 2%- 5 min   12/25/21-3/24/22 88 (99) 9.5 APAP 10/20 19.3 04:58:00 7.4 (14.9) : 6%- 16 min   3/31/22- 6/28/22 84 (100) 7.9 APAP 10/20 18.7 04:40:00 7.0 (14.1) LESTER: 0.8 : 10 min               Immunization History   Administered Date(s) Administered    Influenza Vaccine 10/01/2020    Influenza Vaccine (Tri) Adjuvanted (>65 Yrs FLUAD TRI 82229) 12/03/2019     Past Medical History:   Diagnosis Date    Chronic pain     Diabetes mellitus [facility]     Hyperlipidemia     Hypertension     Pulmonary embolism ([facility]     jan 2016    Sepsis ([facility] 2016     Past Surgical History:   Procedure Laterality Date    HX TUMOR REMOVAL Right     nerve tumor    HX TURP  01/2016       Current Outpatient Medications   Medication Sig Dispense Refill    hydroCHLOROthiazide (HYDRODIURIL) 25 mg tablet       pravastatin (PRAVACHOL) 20 mg tablet       amLODIPine (NORVASC) 5 mg tablet Take 10 mg by mouth daily.  ibuprofen (MOTRIN) 400 mg tablet Take 1 Tab by mouth every eight (8) hours as needed.       cpap machine kit by Does Not Apply route. Overnight Auto CPAP at 5-20 CWP with ramp and humidifier. Mask: Nasal mask. Supplies 99 month. Please send compliance data [id number]. Diagnosis TAWANNA. AHI 30 RDI 30. [employer] order adjustment 1 Kit 0    tiZANidine (ZANAFLEX) 4 mg tablet Take 4 mg by mouth every six (6) hours as needed.  naproxen (NAPROSYN) 500 mg tablet Take 500 mg by mouth two (2) times daily (with meals). (Patient not taking: Reported on 6/29/2022)      aspirin delayed-release 81 mg tablet Take 81 mg by mouth daily. (Patient not taking: Reported on 3/28/2022)      acetaminophen (TYLENOL) 650 mg CR tablet Take 650 mg by mouth every six (6) hours as needed for Pain.  (Patient not taking: Reported on 6/29/2022)           Review of Systems:  HEENT: No epistaxis, no nasal drainage, no difficulty in swallowing, no redness in eyes  Respiratory: No cough sob or wheezing  Sleep: As above  Cardiovascular: no chest pain, no palpitations, no chronic leg edema, no syncope  Gastrointestinal: no abdominal pain, some bloating, no vomiting, no diarrhea, no bleeding symptoms  Genitourinary: No urinary symptoms or hematuria  Integument/breast: No ulcers or rashes  Musculoskeletal:Neg  Neurological: No focal weakness, no seizures, no headaches  Behvioral/Psych: No anxiety, no depression  Constitutional: No fever, no chills, no weight loss, no night sweats     Objective:     Visit Vitals  /76 (BP 1 Location: Left upper arm, BP Patient Position: Sitting, BP Cuff Size: Large adult)   Pulse 63   Temp 97.4 °F (36.3 °C) (Oral)   Resp 16   Ht 6' 1\" (1.854 m)   Wt 123.5 kg (272 lb 3.2 oz)   SpO2 97% Comment: RA Rest   BMI 35.91 kg/m²         Physical Exam:   General:  no acute distress, calm, comfortable, + Obese body habitus, + goate  HEENT: pupils reactive, sclera anicteric, EOM intact, Malampati score 3, uvula- mildine  Tongue: + Macroglossia, midline  Neck: Non tender, no adenopathy or thyroid swelling,  No JVD, right carotid with brisk pulsations - this is also near prior surgical site for C-spine surgery. No Bruit heard  CVS: Regular rate and rythmn,  no murmurs  RS: Moderate air movement bilaterally, no accessory muscle use, clear-  No wheezing  Abd: Obeese, soft, non tender   Ext:  No clubbing, edema or cyanosis  Neuro: non focal, awake, alert, normal muscle tone  Extrm: no leg edema, clubbing or cyanosis  Skin: no rash    Data review:     Echo:    01/29/20    ECHO ADULT COMPLETE 01/29/2020 1/29/2020    Interpretation Summary  · Normal cavity size, wall thickness and systolic function (ejection fraction normal). Calculated left ventricular ejection fraction is 61%. Biplane method used to measure ejection fraction. Mild (grade 1) left ventricular diastolic dysfunction E:E' RATIO = 9.30. · Trace mitral valve regurgitation is present. · Tricuspid regurgitation is inadequate for estimation of right ventricular systolic pressure. · Mild pulmonic valve regurgitation is present. Signed by: Arlene Walden MD on 1/29/2020  3:38 PM    Doppler Carotid: 1/29/2020   Bilateral less than 50% stenosis of the internal carotid arteries. No significant stenosis in the external carotid arteries bilaterally. Antegrade flow in both vertebral arteries. Normal flow in both subclavian arteries. CT Chest:  CT Results (most recent):  Results from Hospital Encounter encounter on 08/15/19    CT CHEST W CONT    Narrative  EXAM: CT chest    INDICATION: Follow-up lung scarring. COMPARISON: Outside hospital ct chest 1/27/2016    TECHNIQUE: Axial CT imaging from the thoracic inlet through the diaphragm with  intravenous contrast. Multiplanar reformats were generated. One or more dose  reduction techniques were used on this CT: automated exposure control,  adjustment of the mAs and/or kVp according to patient size, and iterative  reconstruction techniques.   The specific techniques used on this CT exam have  been documented in the patient's electronic medical record. Digital Imaging and  Communications in Medicine (DICOM) format image data are available to  nonaffiliated external healthcare facilities or entities on a secured, media  free, reciprocally searchable basis with patient authorization for at least a  12-month period after this study. _______________    FINDINGS:    BASE OF THE NECK: Normal.    LUNGS: Scarring/atelectasis is again noted in both lower lungs. A subpleural 0.4  cm nodule is present in the lateral aspect of the left lower lung, unchanged in  appearance since prior examination. There is no new pulmonary nodule. No focal  consolidation. AIRWAY: Normal.    PLEURA: Normal.    MEDIASTINUM: Normal heart size. No pericardial effusion. Vasculature is  unremarkable. LYMPH NODES: No enlarged lymph nodes. UPPER ABDOMEN: Scattered hepatic cysts are present. Scattered bilateral renal  cysts are present. BONES: No acute or aggressive osseous abnormalities identified. Status post  median sternotomy. Postsurgical changes are present within the upper thoracic  spine. OTHER: None.    _______________    Impression  IMPRESSION:    1. Stable scarring/atelectasis in both lower lungs. No suspicious pulmonary  nodule. No focal consolidation. No acute process in the chest.        Prior Sleep Testing  1/21/2015  INDICATION: ESS 13     CONCLUSION     severe obstructive sleep apnea syndrome with AHI of 30, and  respiratory disturbance index (RDI) of 30. A very successful CPAP titration with elimination of obstructive  sleep apnea and associated hypoxemia. At 19 CPAP AHI reduced to  0, RDI reduced to 0 REM supine sleep seen, REM seen. Snore index  4, Arousal index 8    Lowest oxygen saturation 74%. At optimal pressure oxygen  saturation 93-95%. Positional and REM association to respiratory events. No significant LM related arousals seen. 1 LM arousal index  improved to 0 on therapy.     At 12 CPAP AHI reduced to 0, RDI reduced to 0 REM supine sleep  seen, REM seen. Snore Index 404, Arousal Index 3. Sleep efficiency during diagnostic portion 91 with 144 min of  sleep and treatment portion 52 with 187 min sleep    Average heart rate 74-84, Arrythmia none occasional PVCs seen  also elevated BP noted during surgery. RECOMMENDATIONS     Overnight CPAP at 19 CWP with ramp and humidifier. Mask: Quatt[employer] Large or mask of choice. If issues with pressure tolerance CPAP 12 is consider optimal.    If continue to have symptoms and issues with AHI as other CPAP  13,14 were good but noted hypopnea at higher pressure consider  Auto PAP 5-20 or re titration.                Letty Booth DO, [facility]  Pulmonary, Sleep, Critical Care Medicine

## 2022-06-29 NOTE — LETTER
6/29/2022    Patient: Cuong Urena   YOB: 1950   Date of Visit: 6/29/2022     Waleska Brizuela MD  [address] [facility]  [address] [address]  Via Fax: [phone]    Dear Waleska Brizuela MD,      Thank you for referring Mr. Lyndsay Kraimi to [address] [place] [address] for evaluation. My notes for this consultation are attached. If you have questions, please do not hesitate to call me. I look forward to following your patient along with you.       Sincerely,    Girma Prasad, DO

## 2022-12-14 ENCOUNTER — OFFICE VISIT (OUTPATIENT)
Dept: PULMONOLOGY | Age: 72
End: 2022-12-14
Payer: MEDICARE

## 2022-12-14 VITALS
HEART RATE: 91 BPM | HEIGHT: 73 IN | SYSTOLIC BLOOD PRESSURE: 162 MMHG | BODY MASS INDEX: 35.65 KG/M2 | RESPIRATION RATE: 16 BRPM | OXYGEN SATURATION: 95 % | TEMPERATURE: 97.7 F | WEIGHT: 269 LBS | DIASTOLIC BLOOD PRESSURE: 82 MMHG

## 2022-12-14 DIAGNOSIS — I10 ESSENTIAL HYPERTENSION: ICD-10-CM

## 2022-12-14 DIAGNOSIS — G47.33 OSA (OBSTRUCTIVE SLEEP APNEA): Primary | ICD-10-CM

## 2022-12-14 DIAGNOSIS — E66.9 OBESITY (BMI 30-39.9): ICD-10-CM

## 2022-12-14 NOTE — PATIENT INSTRUCTIONS
Please call our clinic back at [phone] or send a message on [address] if you have any questions or concerns or if you are experiencing any of the following: You have not received a follow up appointment within 30 days prior the recommended follow up time. If you are not tolerating treatment plan and/or not able to obtain equipment or prescribed medication(s). if you are experiencing any difficulties with the [employer]  (DME) Company you may be using or is assigned to you. Two weeks have passed and you have not received an appointment for a scheduled procedure. Two weeks have passed since you underwent a test and/or procedure and you have not received your results. If you are using a CPAP/BIPAP, or Home Ventilator Device- Please note the following. Currently, many DMEs are experiencing supply chain difficulties and orders for equipment may be back logged several weeks to months. Your  Durable Medical Equipment (DME ) company is supposed to provide you with replacement filters, tubing and masks. You can either call your DME when you need new supplies or you can arrange for an automatic shipment schedule. Your need to be seen by our office at lat minimum of every 12 months in order to renew the prescription for these supplies. Please make note of who your DME company is and their phone number. Please make sure that you clean your mask and hosing on a regular basis. Your DME can provide you with additional information regarding proper care and cleaning of your device           Safety is strongly encouraged. You should not drive if sleepy, tired, distracted and/or  fatigued. If a procedure or imaging study has been ordered for your by this clinic please call the [place] [facility] or [facility] at  [address] [place] [address] [place] and blood work do not require appointments.   They are considered \"Walk-In\" services and can be obtained at either [facility] or [facility]. Blood work is performed at the Laboratory (Lab) from 08:00am - 04:00pm      -Thank you        Learning About CPAP for Sleep Apnea  What is CPAP? CPAP is a small machine that you use at home every night while you sleep. It increases air pressure in your throat to keep your airway open. When you have sleep apnea, this can help you sleep better, feel better, and avoid future health problems. CPAP stands for \"continuous positive airway pressure. \"  The CPAP machine will have one of the following:  A mask that covers your nose and mouth  A mask that covers your nose only  A nasal pillow that covers only the openings of your nose  Why is it done? CPAP is usually the best treatment for obstructive sleep apnea. It is the first treatment choice and the most widely used. CPAP:  Helps you have more normal sleep, so you feel less sleepy and more alert during the daytime. May help keep heart failure or other heart problems from getting worse. May help lower your blood pressure. If you have a bed partner, they may also sleep better when you use a CPAP. That's because you aren't snoring or restless. Your doctor may suggest CPAP if you have: Moderate to severe sleep apnea. Sleep apnea and coronary artery disease (CAD). Sleep apnea and heart failure. What are the side effects? Some people who use CPAP have:  A dry or stuffy nose and a sore throat. Irritated skin on the face. Bloating. How can you care for yourself? If using CPAP is not comfortable, or if you have certain side effects, work with your doctor to fix them. Here are some things you can try:  Be sure the mask, nasal mask, or nasal pillow fits well. See if your doctor can adjust the pressure of your CPAP. If your nose or mouth is dry, set the machine to deliver warmer or wetter air. Or try using a humidifier.   If your nose is runny or stuffy, talk to your doctor about using a decongestant medicine or steroid nasal spray. Be safe with medicines. Read and follow all instructions on the label. Do not use the medicine longer than the label says. Your doctor may also help you with problems like swallowing air, bloating, or claustrophobia. Talk to your doctor if you're still having problems. If these things don't help, you might try a different type of machine. Where can you learn more? Go to http://www.gray.com/  Enter Jonas Mcdowell in the search box to learn more about \"Learning About CPAP for Sleep Apnea. \"  Current as of: July 6, 2021               Content Version: 13.2  © 9705-8121 [employer]. Care instructions adapted under license by [employer] (which disclaims liability or warranty for this information). If you have questions about a medical condition or this instruction, always ask your healthcare professional. [place] [address] any warranty or liability for your use of this information.

## 2022-12-14 NOTE — PROGRESS NOTES
Cathy Daivs presents today for   Chief Complaint   Patient presents with    Follow-up    CPAP    Sleep Apnea       Is someone accompanying this pt? no    Is the patient using any DME equipment during OV? Yes - CPAP     -DME Company [employer]    Depression Screening:  3 most recent PHQ Screens 12/14/2022   PHQ Not Done -   Little interest or pleasure in doing things Not at all   Feeling down, depressed, irritable, or hopeless Not at all   Total Score PHQ 2 0       [place] Sleepiness Scale:  [place] Sleepiness Scale  12/14/2022   Sitting and Reading 2   Watching TV 3   Sitting, inactive in a public place (e.g. a movie theater or meeting) 1   As a passenger in a car for an hour, without a break 2   Lying down to rest in the afternoon, when circumstances permit 3   Sitting and talking to someone 0   Sitting quietly after lunch without alcohol 1   In a car, while stopped for a few minutes in traffic 0   [place] Sleepiness Score 12         Coordination of Care:  1. Have you been to the ER, urgent care clinic since your last visit? Hospitalized since your last visit? No    2. Have you seen or consulted any other health care providers outside of the [address] [place] [address] since your last visit? Include any pap smears or colon screening. N/a    Medication list has been updated according to patient.

## 2022-12-14 NOTE — PROGRESS NOTES
[facility]  Pulmonary, Critical Care, and Sleep Medicine      Sleep Office Follow up    Name: Pawel Tipton     : 1950     Date: 2022         Reason For Visit: Follow Up Severe TAWANNA and APAP Therapy      Assessment:  Obstructive Sleep Apnea (TAWANNA): Severe (AHI 30; )- Clinically receiving benefit,and he is happy with PAP therapy. Minimal  noted which could be artifact due to patient abutting max CPAP settings. No symptoms or findings to suggest CHF or neurological process. No opioid use. He does take Zanaflex PRN- typically 1-2 times weekly. Hypertension:  BP elevated today in clinic- Patient is asymptomatic  Obesity: Body mass index is 35.49 kg/m². 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 Renew PAP supplies  I have encouraged patient to try and loose some weight and to try and sleep in a semi-up right position so see if this reduces pressure requirement. Monitor for any increase in  on compliance report  Healthy sleep habits and weight encouraged    safety discussed  Follow up with Primary Care Provider (PCP) as directed and for routine health care maintenance. Follow up in 6 months or sooner  pending above testing and clinical course. HPI:   The patient  Is a 81 y.o. male who presents today for routine follow up. Today the following is noted and/or reported:(Last Clinic Visit: 22)      TAWANNA/Sleep:     Patient reports that he is doing well on PAP therapy and has no complaints other than some occasional xerostomia  The patient has been using his device every night, but his 4 hour use time is down a bit.   The patient reports that he sometimes, does not put his mask on when he first gets into bed and there are days when he falls sleep before starting his PAP therapy. Compliance requirements were reviewed with the patient today. He does take Zanaflex for back spasms- typically, he takes 1-2 times weekly  He rare takes alcohol. Denies drug use  Patient again denies any drowsy driving  Reports good sleep quality  No dyspnea, legs swelling, orthopnea, kidney problems  No significant weight changes  No mask difficulties  No skin breakdown. No aerophagia or bloating. No orthopnea, gasping at night. No chest pain, no jaw pain  No edema, dizziness, syncope or presyncope  He prefers to sleep with a thin pillow and/or flat     Reports continues proper PAP hygiene care      Other:  Does report some neck discomfort which he attributes to a report of C-spine fusion and resection of cervical nerve sheath tumor procedure at [facility] ([address]). Insomnia:  Currently not an issue  His sleeping has improved and has previously stopped taking sleep aids.     Prior Sleep Agents  - Sonata- no benefit  - RemFresh- works but groggy if does not dedicate at least 7 hours for sleep    Pulmonary Nodule:  - No findings of pulmonary nodule on follow up CT Chest      [place]:  Initial 13/24    [place] Scale 12/14/2022 6/29/2022 3/28/2022 3/30/2021 8/6/2020 1/17/2020 11/29/2019   Sitting and Reading 2 2 1 1 2 2 1   Watching TV 3 2 2 2 0 2 1   Sitting, inactive in a public place (e.g. a movie theater or meeting) 1 1 1 1 0 0 0   As a passenger in a car for an hour, without a break 2 2 3 2 3 2 1   Lying down to rest in the afternoon, when circumstances permit 3 3 3 3 3 3 2   Sitting and talking to someone 0 0 0 0 0 0 0   Sitting quietly after lunch without alcohol 1 2 2 2 1 2 2   In a car, while stopped for a few minutes in traffic 0 0 0 1 0 0 0   [place] Sleepiness Score 12 12 12 12 9 11 7     3 most recent PHQ Screens 12/14/2022   PHQ Not Done -   Little interest or pleasure in doing things Not at all   Feeling down, depressed, irritable, or hopeless Not at all   Total Score PHQ 2 0     PAP Compliance  Report & Summary Trends  DME: [employer]  Device: ResMed, [id number]  Mask: FFM-AirTouch  Date >4 hr use % time (Total % Use) AHI PAP Rx Pressure @ 95% Median Use Time Leak-Median  (95%) Notes   4/26-7/24/17 18 (64) 4.2 APAP 5/20 14.2 3:36 56.9    12/8/17-3/7/2018 49 (86) 4.4 APAP 5/20 13.2 4:14 73.3    3/9/18-6/6/18 77(89) 3.7 APAP 5/20 15.6 5:01 9.1    05/09/19-08/06/19 84 (100) 6.0 APAP 5/20 16.5 04:44 9.3 (16.5) : 0%- None   08/31/19-11/28/19 84 (97) 5.3 APAP 10/20 18.4 05:00 11.7 (20.1) : 2%- 5 min   10/19/19- 01/16/20 78 (97) 5.0 APAP 10/20 18.4 04:54 12.6 (21.0) : 1%- 3 min   5/8/20-8/5/20 79 (97) 8.2 APAP 10/20 19.4 04:46 15.0 (19.4) : 2%- 5 min   12/30/20-03/29/21 79 (100)        7.2 APAP 10/20 18.8 05:00:00 10.7 (18.6) : 2%- 5 min   12/25/21-3/24/22 88 (99) 9.5 APAP 10/20 19.3 04:58:00 7.4 (14.9) : 6%- 16 min   3/31/22- 6/28/22 84 (100) 7.9 APAP 10/20 18.7 04:40:00 7.0 (14.1) LESTER: 0.8 : 10 min   9/15/22-12/13/22 54 (100) 7.8 APAP 10/20 18.7 04:07:00 1.6 (8.9) LESTER: 0.6  6 minutes     Immunization History   Administered Date(s) Administered    Influenza Vaccine 10/01/2020    Influenza Vaccine (Tri) Adjuvanted (>65 Yrs FLUAD TRI 32896) 12/03/2019     Past Medical History:   Diagnosis Date    Chronic pain     Diabetes mellitus ([facility]     Hyperlipidemia     Hypertension     [facility]     jan 2016    Sepsis [facility] 2016     Past Surgical History:   Procedure Laterality Date    HX TUMOR REMOVAL Right     nerve tumor    HX TURP  01/2016       Current Outpatient Medications   Medication Sig Dispense Refill    hydroCHLOROthiazide (HYDRODIURIL) 25 mg tablet       pravastatin (PRAVACHOL) 20 mg tablet       amLODIPine (NORVASC) 5 mg tablet Take 10 mg by mouth daily. ibuprofen (MOTRIN) 400 mg tablet Take 1 Tab by mouth every eight (8) hours as needed. cpap machine kit by Does Not Apply route.  Overnight Auto CPAP at 5-20 CWP with ramp and humidifier. Mask: Nasal mask. Supplies 99 month. Please send compliance data [id number]. Diagnosis TAWANNA. AHI 30 RDI 30. [employer] order adjustment 1 Kit 0    naproxen (NAPROSYN) 500 mg tablet Take 500 mg by mouth two (2) times daily (with meals). tiZANidine (ZANAFLEX) 4 mg tablet Take 4 mg by mouth every six (6) hours as needed. aspirin delayed-release 81 mg tablet Take 81 mg by mouth daily. (Patient not taking: No sig reported)      acetaminophen (TYLENOL) 650 mg CR tablet Take 650 mg by mouth every six (6) hours as needed for Pain. (Patient not taking: No sig reported)           Review of Systems:  HEENT: No epistaxis, no nasal drainage, no difficulty in swallowing, no redness in eyes  Respiratory: No cough sob or wheezing  Sleep: As above  Cardiovascular: no chest pain, no palpitations, no chronic leg edema, no syncope  Gastrointestinal: no abdominal pain, some bloating, no vomiting, no diarrhea, no bleeding symptoms  Genitourinary: No urinary symptoms or hematuria  Integument/breast: No ulcers or rashes  Musculoskeletal:Neg  Neurological: No focal weakness, no seizures, no headaches  Behvioral/Psych: No anxiety, no depression  Constitutional: No fever, no chills, no weight loss, no night sweats     Objective:     Visit Vitals  BP (!) 162/82 (BP 1 Location: Left upper arm, BP Patient Position: Sitting, BP Cuff Size: Adult long)   Pulse 91   Temp 97.7 °F (36.5 °C) (Temporal)   Resp 16   Ht 6' 1\" (1.854 m)   Wt 122 kg (269 lb)   SpO2 95% Comment: ra rest   BMI 35.49 kg/m²           Physical Exam:   General:  no acute distress, calm, comfortable, + Obese body habitus, + goate  HEENT: pupils reactive, sclera anicteric, EOM intact, Malampati score 3, uvula- mildine  Tongue: + Macroglossia, midline  Neck: Non tender, no adenopathy or thyroid swelling,  No JVD, right carotid with brisk pulsations - this is also near prior surgical site for C-spine surgery.  No Bruit heard  Abd: Obeese, soft, non tender   Ext:  No clubbing, edema or cyanosis  Neuro: non focal, awake, alert, normal muscle tone  Extrm: no leg edema, clubbing or cyanosis  Skin: no rash    Data review:     Echo:    01/29/20    ECHO ADULT COMPLETE 01/29/2020 1/29/2020    Interpretation Summary  · Normal cavity size, wall thickness and systolic function (ejection fraction normal). Calculated left ventricular ejection fraction is 61%. Biplane method used to measure ejection fraction. Mild (grade 1) left ventricular diastolic dysfunction E:E' RATIO = 9.30. · Trace mitral valve regurgitation is present. · Tricuspid regurgitation is inadequate for estimation of right ventricular systolic pressure. · Mild pulmonic valve regurgitation is present. Signed by: Renata Johnson MD on 1/29/2020  3:38 PM    Doppler Carotid: 1/29/2020   Bilateral less than 50% stenosis of the internal carotid arteries. No significant stenosis in the external carotid arteries bilaterally. Antegrade flow in both vertebral arteries. Normal flow in both subclavian arteries. CT Chest:  CT Results (most recent):  Results from Hospital Encounter encounter on 08/15/19    CT CHEST W CONT    Narrative  EXAM: CT chest    INDICATION: Follow-up lung scarring. COMPARISON: Outside hospital ct chest 1/27/2016    TECHNIQUE: Axial CT imaging from the thoracic inlet through the diaphragm with  intravenous contrast. Multiplanar reformats were generated. One or more dose  reduction techniques were used on this CT: automated exposure control,  adjustment of the mAs and/or kVp according to patient size, and iterative  reconstruction techniques. The specific techniques used on this CT exam have  been documented in the patient's electronic medical record.   Digital Imaging and  Communications in Medicine (DICOM) format image data are available to  nonaffiliated external healthcare facilities or entities on a secured, media  free, reciprocally searchable basis with patient authorization for at least a  12-month period after this study. _______________    FINDINGS:    BASE OF THE NECK: Normal.    LUNGS: Scarring/atelectasis is again noted in both lower lungs. A subpleural 0.4  cm nodule is present in the lateral aspect of the left lower lung, unchanged in  appearance since prior examination. There is no new pulmonary nodule. No focal  consolidation. AIRWAY: Normal.    PLEURA: Normal.    MEDIASTINUM: Normal heart size. No pericardial effusion. Vasculature is  unremarkable. LYMPH NODES: No enlarged lymph nodes. UPPER ABDOMEN: Scattered hepatic cysts are present. Scattered bilateral renal  cysts are present. BONES: No acute or aggressive osseous abnormalities identified. Status post  median sternotomy. Postsurgical changes are present within the upper thoracic  spine. OTHER: None.    _______________    Impression  IMPRESSION:    1. Stable scarring/atelectasis in both lower lungs. No suspicious pulmonary  nodule. No focal consolidation. No acute process in the chest.        Prior Sleep Testing  1/21/2015  INDICATION: ESS 13     CONCLUSION     severe obstructive sleep apnea syndrome with AHI of 30, and  respiratory disturbance index (RDI) of 30. A very successful CPAP titration with elimination of obstructive  sleep apnea and associated hypoxemia. At 19 CPAP AHI reduced to  0, RDI reduced to 0 REM supine sleep seen, REM seen. Snore index  4, Arousal index 8    Lowest oxygen saturation 74%. At optimal pressure oxygen  saturation 93-95%. Positional and REM association to respiratory events. No significant LM related arousals seen. 1 LM arousal index  improved to 0 on therapy. At 12 CPAP AHI reduced to 0, RDI reduced to 0 REM supine sleep  seen, REM seen. Snore Index 404, Arousal Index 3.     Sleep efficiency during diagnostic portion 91 with 144 min of  sleep and treatment portion 52 with 187 min sleep    Average heart rate 74-84, Arrythmia none occasional PVCs seen  also elevated BP noted during surgery. RECOMMENDATIONS     Overnight CPAP at 19 CWP with ramp and humidifier. Mask: Quattro Mirage Large or mask of choice. If issues with pressure tolerance CPAP 12 is consider optimal.    If continue to have symptoms and issues with AHI as other CPAP  13,14 were good but noted hypopnea at higher pressure consider  Auto PAP 5-20 or re titration.                Shira Granados DO, [facility]  Pulmonary, Sleep, Critical Care Medicine

## 2022-12-14 NOTE — LETTER
12/14/2022    Patient: Monroe Prince   YOB: 1950   Date of Visit: 12/14/2022     Darryle Mealy, MD  [address] [facility]  [address] [facility] [address]  Via Fax: [phone]    Dear Darryle Mealy, MD,      Thank you for referring Mr. Karina Perez to [address] [place] [address] for evaluation. My notes for this consultation are attached. If you have questions, please do not hesitate to call me. I look forward to following your patient along with you.       Sincerely,    Karla Mcintosh, DO

## 2023-05-22 PROBLEM — E66.01 SEVERE OBESITY WITH BODY MASS INDEX (BMI) OF 35.0 TO 39.9 WITH SERIOUS COMORBIDITY (HCC): Status: ACTIVE | Noted: 2018-06-07

## 2023-05-22 RX ORDER — LOSARTAN POTASSIUM AND HYDROCHLOROTHIAZIDE 12.5; 5 MG/1; MG/1
1 TABLET ORAL DAILY
COMMUNITY

## 2023-05-22 RX ORDER — CYCLOBENZAPRINE HCL 10 MG
TABLET ORAL EVERY 8 HOURS PRN
COMMUNITY

## 2023-06-08 ENCOUNTER — OFFICE VISIT (OUTPATIENT)
Age: 73
End: 2023-06-08
Payer: MEDICARE

## 2023-06-08 VITALS
OXYGEN SATURATION: 96 % | RESPIRATION RATE: 18 BRPM | TEMPERATURE: 97.2 F | HEIGHT: 73 IN | HEART RATE: 93 BPM | SYSTOLIC BLOOD PRESSURE: 148 MMHG | BODY MASS INDEX: 35.92 KG/M2 | DIASTOLIC BLOOD PRESSURE: 70 MMHG | WEIGHT: 271 LBS

## 2023-06-08 DIAGNOSIS — Z99.89 OSA ON CPAP: Primary | ICD-10-CM

## 2023-06-08 DIAGNOSIS — G47.19 EXCESSIVE DAYTIME SLEEPINESS: ICD-10-CM

## 2023-06-08 DIAGNOSIS — G47.33 OSA ON CPAP: Primary | ICD-10-CM

## 2023-06-08 DIAGNOSIS — I10 ESSENTIAL (PRIMARY) HYPERTENSION: ICD-10-CM

## 2023-06-08 PROCEDURE — 99213 OFFICE O/P EST LOW 20 MIN: CPT | Performed by: INTERNAL MEDICINE

## 2023-06-08 PROCEDURE — 1123F ACP DISCUSS/DSCN MKR DOCD: CPT | Performed by: INTERNAL MEDICINE

## 2023-06-08 PROCEDURE — 3078F DIAST BP <80 MM HG: CPT | Performed by: INTERNAL MEDICINE

## 2023-06-08 PROCEDURE — 3074F SYST BP LT 130 MM HG: CPT | Performed by: INTERNAL MEDICINE

## 2023-06-08 ASSESSMENT — SLEEP AND FATIGUE QUESTIONNAIRES
ESS TOTAL SCORE: 15
HOW LIKELY ARE YOU TO NOD OFF OR FALL ASLEEP WHILE SITTING AND READING: 2
HOW LIKELY ARE YOU TO NOD OFF OR FALL ASLEEP WHILE SITTING QUIETLY AFTER LUNCH WITHOUT ALCOHOL: 3
HOW LIKELY ARE YOU TO NOD OFF OR FALL ASLEEP IN A CAR, WHILE STOPPED FOR A FEW MINUTES IN TRAFFIC: 0
HOW LIKELY ARE YOU TO NOD OFF OR FALL ASLEEP WHILE SITTING INACTIVE IN A PUBLIC PLACE: 2
HOW LIKELY ARE YOU TO NOD OFF OR FALL ASLEEP WHILE SITTING AND TALKING TO SOMEONE: 0
HOW LIKELY ARE YOU TO NOD OFF OR FALL ASLEEP WHILE LYING DOWN TO REST IN THE AFTERNOON WHEN CIRCUMSTANCES PERMIT: 3
HOW LIKELY ARE YOU TO NOD OFF OR FALL ASLEEP WHILE WATCHING TV: 3
HOW LIKELY ARE YOU TO NOD OFF OR FALL ASLEEP WHEN YOU ARE A PASSENGER IN A CAR FOR AN HOUR WITHOUT A BREAK: 2

## 2023-06-08 ASSESSMENT — PATIENT HEALTH QUESTIONNAIRE - PHQ9
SUM OF ALL RESPONSES TO PHQ QUESTIONS 1-9: 0
SUM OF ALL RESPONSES TO PHQ QUESTIONS 1-9: 0
2. FEELING DOWN, DEPRESSED OR HOPELESS: 0
1. LITTLE INTEREST OR PLEASURE IN DOING THINGS: 0
SUM OF ALL RESPONSES TO PHQ QUESTIONS 1-9: 0
SUM OF ALL RESPONSES TO PHQ QUESTIONS 1-9: 0
SUM OF ALL RESPONSES TO PHQ9 QUESTIONS 1 & 2: 0

## 2023-06-08 NOTE — PATIENT INSTRUCTIONS
strongly encouraged. You should not drive if sleepy, tired, distracted and/or  fatigued. If a procedure or imaging study has been ordered for your by this clinic please call the [place] at [facility] or [place] at  [address] [place] [address] and blood work do not require appointments. They are considered \"Walk-In\" services and can be obtained at either [facility] or [facility].     Blood work is performed at the Laboratory (Lab) from 08:00am - 04:00pm      -Thank you

## 2023-06-08 NOTE — PROGRESS NOTES
Alvin Hamman presents today for   Chief Complaint   Patient presents with    Follow-up       Is someone accompanying this pt? no    Is the patient using any DME equipment during OV? no    -[address]    Depression Screening:  PHQ-9  6/8/2023   Little interest or pleasure in doing things 0   Little interest or pleasure in doing things -   Feeling down, depressed, or hopeless 0   PHQ-2 Score 0   Total Score PHQ 2 -   PHQ-9 Total Score 0        [place] Sleepiness Scale:  Sleep Medicine 6/8/2023   Sitting and reading 2   Watching TV 3   Sitting, inactive in a public place (e.g. a theatre or a meeting) 2   As a passenger in a car for an hour without a break 2   Lying down to rest in the afternoon when circumstances permit 3   Sitting and talking to someone 0   Sitting quietly after a lunch without alcohol 3   In a car, while stopped for a few minutes in traffic 0   [place] Sleepiness Score 15             Coordination of Care:  1. Have you been to the ER, urgent care clinic since your last visit? Hospitalized since your last visit?  no    2. Have you seen or consulted any other health care providers outside of the [address] [place] [address] since your last visit? Include any pap smears or colon screening. no    Medication list has been updated according to patient.
optimal pressure oxygen  saturation 93-95%. Positional and REM association to respiratory events. No significant LM related arousals seen. 1 LM arousal index  improved to 0 on therapy. At 12 CPAP AHI reduced to 0, RDI reduced to 0 REM supine sleep  seen, REM seen. Snore Index 404, Arousal Index 3. Sleep efficiency during diagnostic portion 91 with 144 min of  sleep and treatment portion 52 with 187 min sleep    Average heart rate 74-84, Arrythmia none occasional PVCs seen  also elevated BP noted during surgery. RECOMMENDATIONS     Overnight CPAP at 19 CWP with ramp and humidifier. Mask: Quattro Mirage Large or mask of choice. If issues with pressure tolerance CPAP 12 is consider optimal.    If continue to have symptoms and issues with AHI as other CPAP  13,14 were good but noted hypopnea at higher pressure consider  Auto PAP 5-20 or re titration.                Justine Fermin DO, [facility]  Pulmonary, Sleep, Critical Care Medicine

## 2024-08-07 ENCOUNTER — HOSPITAL ENCOUNTER (OUTPATIENT)
Facility: HOSPITAL | Age: 74
Discharge: HOME | End: 2024-08-09
Attending: INTERNAL MEDICINE
Payer: MEDICARE

## 2024-08-07 VITALS
BODY MASS INDEX: 35.78 KG/M2 | DIASTOLIC BLOOD PRESSURE: 78 MMHG | WEIGHT: 270 LBS | HEART RATE: 81 BPM | SYSTOLIC BLOOD PRESSURE: 129 MMHG | HEIGHT: 73 IN

## 2024-08-07 DIAGNOSIS — R06.09 OTHER FORMS OF DYSPNEA: ICD-10-CM

## 2024-08-07 DIAGNOSIS — R06.02 SOB (SHORTNESS OF BREATH): Primary | ICD-10-CM

## 2024-08-07 DIAGNOSIS — R06.3 PERIODIC BREATHING: ICD-10-CM

## 2024-08-07 DIAGNOSIS — G47.31 PRIMARY CENTRAL SLEEP APNEA: ICD-10-CM

## 2024-08-07 PROCEDURE — 93306 TTE W/DOPPLER COMPLETE: CPT

## 2024-08-08 LAB
ECHO AO ROOT DIAM: 3.1 CM
ECHO AO ROOT INDEX: 1.27 CM/M2
ECHO AV AREA PEAK VELOCITY: 2.6 CM2
ECHO AV AREA VTI: 2 CM2
ECHO AV AREA/BSA PEAK VELOCITY: 1.1 CM2/M2
ECHO AV AREA/BSA VTI: 0.8 CM2/M2
ECHO AV MEAN GRADIENT: 4 MMHG
ECHO AV MEAN VELOCITY: 0.9 M/S
ECHO AV PEAK GRADIENT: 6 MMHG
ECHO AV PEAK VELOCITY: 1.2 M/S
ECHO AV VELOCITY RATIO: 0.92
ECHO AV VTI: 29.4 CM
ECHO BSA: 2.51 M2
ECHO EST RA PRESSURE: 3 MMHG
ECHO LA VOL A-L A2C: 47 ML (ref 18–58)
ECHO LA VOL A-L A4C: 77 ML (ref 18–58)
ECHO LA VOL BP: 59 ML (ref 18–58)
ECHO LA VOL MOD A2C: 45 ML (ref 18–58)
ECHO LA VOL MOD A4C: 72 ML (ref 18–58)
ECHO LA VOL/BSA BIPLANE: 24 ML/M2 (ref 16–34)
ECHO LA VOLUME AREA LENGTH: 62 ML
ECHO LA VOLUME INDEX A-L A2C: 19 ML/M2 (ref 16–34)
ECHO LA VOLUME INDEX A-L A4C: 32 ML/M2 (ref 16–34)
ECHO LA VOLUME INDEX AREA LENGTH: 25 ML/M2 (ref 16–34)
ECHO LA VOLUME INDEX MOD A2C: 18 ML/M2 (ref 16–34)
ECHO LA VOLUME INDEX MOD A4C: 30 ML/M2 (ref 16–34)
ECHO LV E' LATERAL VELOCITY: 7 CM/S
ECHO LV E' SEPTAL VELOCITY: 6 CM/S
ECHO LV EDV A2C: 34 ML
ECHO LV EDV A4C: 65 ML
ECHO LV EDV BP: 52 ML (ref 67–155)
ECHO LV EDV INDEX A4C: 27 ML/M2
ECHO LV EDV INDEX BP: 21 ML/M2
ECHO LV EDV NDEX A2C: 14 ML/M2
ECHO LV EJECTION FRACTION A2C: 58 %
ECHO LV EJECTION FRACTION A4C: 63 %
ECHO LV EJECTION FRACTION BIPLANE: 62 % (ref 55–100)
ECHO LV ESV A2C: 14 ML
ECHO LV ESV A4C: 24 ML
ECHO LV ESV BP: 20 ML (ref 22–58)
ECHO LV ESV INDEX A2C: 6 ML/M2
ECHO LV ESV INDEX A4C: 10 ML/M2
ECHO LV ESV INDEX BP: 8 ML/M2
ECHO LV FRACTIONAL SHORTENING: 30 % (ref 28–44)
ECHO LV INTERNAL DIMENSION DIASTOLE INDEX: 1.76 CM/M2
ECHO LV INTERNAL DIMENSION DIASTOLIC: 4.3 CM (ref 4.2–5.9)
ECHO LV INTERNAL DIMENSION SYSTOLIC INDEX: 1.23 CM/M2
ECHO LV INTERNAL DIMENSION SYSTOLIC: 3 CM
ECHO LV IVSD: 1.3 CM (ref 0.6–1)
ECHO LV MASS 2D: 207.8 G (ref 88–224)
ECHO LV MASS INDEX 2D: 85.2 G/M2 (ref 49–115)
ECHO LV POSTERIOR WALL DIASTOLIC: 1.3 CM (ref 0.6–1)
ECHO LV RELATIVE WALL THICKNESS RATIO: 0.6
ECHO LVOT AREA: 2.8 CM2
ECHO LVOT AV VTI INDEX: 0.69
ECHO LVOT DIAM: 1.9 CM
ECHO LVOT MEAN GRADIENT: 2 MMHG
ECHO LVOT PEAK GRADIENT: 5 MMHG
ECHO LVOT PEAK VELOCITY: 1.1 M/S
ECHO LVOT STROKE VOLUME INDEX: 23.5 ML/M2
ECHO LVOT SV: 57.2 ML
ECHO LVOT VTI: 20.2 CM
ECHO MV A VELOCITY: 0.63 M/S
ECHO MV AREA PHT: 4.1 CM2
ECHO MV AREA VTI: 2.1 CM2
ECHO MV E DECELERATION TIME (DT): 195.5 MS
ECHO MV E VELOCITY: 0.83 M/S
ECHO MV E/A RATIO: 1.32
ECHO MV E/E' LATERAL: 11.86
ECHO MV E/E' RATIO (AVERAGED): 12.85
ECHO MV E/E' SEPTAL: 13.83
ECHO MV LVOT VTI INDEX: 1.38
ECHO MV MAX VELOCITY: 0.9 M/S
ECHO MV MEAN GRADIENT: 1 MMHG
ECHO MV MEAN VELOCITY: 0.5 M/S
ECHO MV PEAK GRADIENT: 3 MMHG
ECHO MV PRESSURE HALF TIME (PHT): 53.3 MS
ECHO MV VTI: 27.8 CM
ECHO PV MAX VELOCITY: 1.1 M/S
ECHO PV PEAK GRADIENT: 5 MMHG
ECHO RA END SYSTOLIC VOLUME APICAL 4 CHAMBER INDEX BSA: 22 ML/M2
ECHO RA VOLUME: 54 ML
ECHO RIGHT VENTRICULAR SYSTOLIC PRESSURE (RVSP): 34 MMHG
ECHO RV FREE WALL PEAK S': 16 CM/S
ECHO RV INTERNAL DIMENSION: 4.3 CM
ECHO RV TAPSE: 2.2 CM (ref 1.7–?)
ECHO RVOT PEAK GRADIENT: 1 MMHG
ECHO RVOT PEAK VELOCITY: 0.6 M/S
ECHO RVOT VTI: 14.7 CM
ECHO TV REGURGITANT MAX VELOCITY: 2.8 M/S
ECHO TV REGURGITANT PEAK GRADIENT: 31 MMHG